# Patient Record
Sex: MALE | Race: WHITE
[De-identification: names, ages, dates, MRNs, and addresses within clinical notes are randomized per-mention and may not be internally consistent; named-entity substitution may affect disease eponyms.]

---

## 2019-05-09 ENCOUNTER — HOSPITAL ENCOUNTER (EMERGENCY)
Dept: HOSPITAL 1 - ED | Age: 84
Discharge: PSYCHIATRIC OR SUBSTANCE USE FACILITY | End: 2019-05-09
Payer: COMMERCIAL

## 2019-05-09 VITALS
HEIGHT: 67 IN | BODY MASS INDEX: 26.68 KG/M2 | BODY MASS INDEX: 26.68 KG/M2 | WEIGHT: 170 LBS | WEIGHT: 170 LBS | HEIGHT: 67 IN

## 2019-05-09 VITALS — DIASTOLIC BLOOD PRESSURE: 85 MMHG | SYSTOLIC BLOOD PRESSURE: 155 MMHG

## 2019-05-09 DIAGNOSIS — S09.8XXA: ICD-10-CM
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
## 2019-05-09 DIAGNOSIS — I10: ICD-10-CM

## 2019-05-09 DIAGNOSIS — F20.9: ICD-10-CM

## 2019-05-09 DIAGNOSIS — E11.9: ICD-10-CM
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
## 2019-05-09 DIAGNOSIS — S20.211A: Primary | ICD-10-CM
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
## 2019-05-11 ENCOUNTER — HOSPITAL ENCOUNTER (INPATIENT)
Dept: HOSPITAL 1 - ED | Age: 84
LOS: 5 days | Discharge: ANOTHER HOSPITAL | DRG: 205 | End: 2019-05-16
Attending: FAMILY MEDICINE | Admitting: FAMILY MEDICINE
Payer: COMMERCIAL

## 2019-05-11 VITALS
HEIGHT: 68 IN | WEIGHT: 175 LBS | BODY MASS INDEX: 26.52 KG/M2 | HEIGHT: 68 IN | BODY MASS INDEX: 26.52 KG/M2 | WEIGHT: 175 LBS

## 2019-05-11 VITALS — DIASTOLIC BLOOD PRESSURE: 65 MMHG | SYSTOLIC BLOOD PRESSURE: 133 MMHG

## 2019-05-11 VITALS — DIASTOLIC BLOOD PRESSURE: 66 MMHG | SYSTOLIC BLOOD PRESSURE: 135 MMHG

## 2019-05-11 VITALS — SYSTOLIC BLOOD PRESSURE: 134 MMHG | DIASTOLIC BLOOD PRESSURE: 83 MMHG

## 2019-05-11 VITALS — SYSTOLIC BLOOD PRESSURE: 120 MMHG | DIASTOLIC BLOOD PRESSURE: 59 MMHG

## 2019-05-11 DIAGNOSIS — Z79.82: ICD-10-CM

## 2019-05-11 DIAGNOSIS — E87.6: ICD-10-CM

## 2019-05-11 DIAGNOSIS — F03.90: ICD-10-CM

## 2019-05-11 DIAGNOSIS — D68.69: ICD-10-CM

## 2019-05-11 DIAGNOSIS — G93.41: ICD-10-CM

## 2019-05-11 DIAGNOSIS — Z79.4: ICD-10-CM

## 2019-05-11 DIAGNOSIS — F20.9: ICD-10-CM

## 2019-05-11 DIAGNOSIS — E78.5: ICD-10-CM

## 2019-05-11 DIAGNOSIS — F20.0: ICD-10-CM

## 2019-05-11 DIAGNOSIS — E43: ICD-10-CM

## 2019-05-11 DIAGNOSIS — E83.42: ICD-10-CM

## 2019-05-11 DIAGNOSIS — Z79.84: ICD-10-CM

## 2019-05-11 DIAGNOSIS — M94.0: Primary | ICD-10-CM

## 2019-05-11 DIAGNOSIS — I10: ICD-10-CM

## 2019-05-11 DIAGNOSIS — N17.0: ICD-10-CM

## 2019-05-11 DIAGNOSIS — N40.0: ICD-10-CM

## 2019-05-11 DIAGNOSIS — E11.9: ICD-10-CM

## 2019-05-11 LAB
ALBUMIN SERPL-MCNC: 2.7 G/DL (ref 3.4–5)
ALP SERPL-CCNC: 68 U/L (ref 46–116)
ALT SERPL-CCNC: 37 U/L (ref 16–63)
AST SERPL-CCNC: 18 U/L (ref 15–37)
BASOPHILS NFR BLD: 0.5 % (ref 0–2)
BILIRUB SERPL-MCNC: 0.5 MG/DL (ref 0.2–1)
BUN SERPL-MCNC: 19 MG/DL (ref 7–18)
CALCIUM SERPL-MCNC: 9.1 MG/DL (ref 8.5–10.1)
CHLORIDE SERPL-SCNC: 100 MMOL/L (ref 98–107)
CHOLEST SERPL-MCNC: 144 MG/DL (ref ?–200)
CHOLEST SERPL-MCNC: 152 MG/DL (ref ?–200)
CHOLEST/HDLC SERPL: 3.5 MG/DL
CO2 SERPL-SCNC: 25.7 MMOL/L (ref 21–32)
CREAT SERPL-MCNC: 1.7 MG/DL (ref 0.7–1.3)
ERYTHROCYTE [DISTWIDTH] IN BLOOD BY AUTOMATED COUNT: 15.6 % (ref 11.5–14.5)
GFR SERPLBLD BASED ON 1.73 SQ M-ARVRAT: (no result) ML/MIN
GLUCOSE SERPL-MCNC: 109 MG/DL (ref 74–106)
HDLC SERPL-MCNC: 41 MG/DL (ref 40–60)
HDLC SERPL-MCNC: 43 MG/DL (ref 40–60)
MAGNESIUM SERPL-MCNC: 1.7 MG/DL (ref 1.8–2.4)
PHOSPHATE SERPL-MCNC: 4.1 MG/DL (ref 2.5–4.9)
PLATELET # BLD: 284 X10^3MCL (ref 130–400)
POTASSIUM SERPL-SCNC: 3.3 MMOL/L (ref 3.5–5.1)
PROT SERPL-MCNC: 6.3 G/DL (ref 6.4–8.2)
SODIUM SERPL-SCNC: 137 MMOL/L (ref 136–145)
TRIGL SERPL-MCNC: 138 MG/DL (ref ?–150)

## 2019-05-11 PROCEDURE — A9537 TC99M MEBROFENIN: HCPCS

## 2019-05-11 NOTE — NUR
RECEIVED PATIENT FROM THE ED VIA [employer]. PATIENT IS STABLE, NO SIGNS OF
APPARETN SOB, PATIENT STATES HE HAS SOME DISCOMFORT TO THE RIGHT FLANK. IV TO
THE LFA INFUSING WELL AT 50ML PER HR. PATIENT IS ON ROOM AIR. INCONTINENT OF
URINE AND BOWEL. PATIENT CONMES IN FROM [facility]. HE IS
CONFUSED AND ALERT AND ORIENTED TO PERSON AND TIME. SAFETY PRECAUTIONS IN
PLACE. SITTER AT BEDSIDE.

## 2019-05-11 NOTE — NUR
ADMINISTERED NOON MEDICATIONS. PATIENT TOLORATED WELL. THE PATIENT IS STABLE,
NO SIGNS OF APPARENT SOB, PATIENT IS CONFUSED, ALERT AND ORIENTED TO PERSON.
SITTER AT BEDSIDE. IV INFUSUNG WELL. NO REDNESS NOTED. PATIENT STATES SOME
DISCOMFORT RIGHT FLANK. PATIENT DENIES OTHER NEEDS AT THIS TIME.

## 2019-05-11 NOTE — NUR
PATIENT IS RESTING IN BED HE IS ASLEEP. NO SIGNS OF PAIN OR APPARENT SOB.
SAFETY PRECAUTION IN PLACE, SITTER AT BEDSIDE.

## 2019-05-11 NOTE — NUR
RECEIVED PT FROM DAY SHIFT RN. PT IS RESTING. PT IS ORIENTED TO SELF AND ABLE
TO FOLLOW SIMPLE COMMMANDS. NO SIGNS OF CHEST PAIN OR SHORTNESS OF BREATH ON
ROOM AIR. NO USE OF ACCESSORY MUSCLES OR LABORED BREATHING ON ASSESSMENT.
THERE IS A LFA 22G IV THAT IS CLEAN DRY AND INTACT AT THIS TIME. PT DOES NOT
SHOW ANY COMBATIVE BEHAVIOR AT THIS TIME. SITTER IS AT THE BEDSIDE. SAFETY
MEASURES ARE IN PLACE. BED IS IN THE LOWEST POSITION. WILL CONTINUE TO
MONITOR.

## 2019-05-11 NOTE — NUR
PT IN POSITION OF COMFORT RESPS E/U
MEDICAL ASSISTANT FROM [facility] AT BEDSIDE
CALL LIGHT WITHIN REACH

## 2019-05-11 NOTE — NUR
ECHO TECH AT BEDSIDE. PERFORMED GLUCOSE CHECK 110. NO COVERAGE NEEDED PER
SLIDING SCALE. PATIENT IS STABLE NO SIGNS OF APPARENT SOB. SITTER AT BEDSIDE.

## 2019-05-11 NOTE — NUR
PT BIB PARAMEDICS FROM [facility] FOR C/O MORE ALTERED THAN USUAL AND FOR RUQ
PAIN WITH N/1 EPISODE OF VOMITING. PER PARAMEDICS VOMIT WAS JUST BILE LOOKING.
PT WAS HERE YESTERDAY FOR A FALL WITH A HEAD INJURY WITH A CT PERFORMED AND NO
ABNORMAL FINDINGS. PT DOES NOT HAVE ANY WOUNDS TO THE HEAD THAT ARE VISIBLE. PT
HAS HX OF HTN, DM, SCHIZOPHRENIA, AND DEMENTIA. PT IS SELECTIVELY ANSWERING
QUESTIONS, BUT RESPONDS APPROPRIATELY. PT IS UNABLE TO TELL ME THE YEAR, BUT
WHEN ASKED WHAT HIS NAME IS HE STATES "CANT YOU READ". PT WILL NOT OPEN HIS
EYES BECAUSE HE SAYS "IM TRYING TO SLEEP". PT IS WEAK AND IS NOT HELPING MOVE
HIS EXTREMETIES OR BODY IN ANY WAY POSSIBLE. PT HAS 2MM PUPILS WITH THE R EYE
MORE RED IN THE SCLERA THAN THE LEFT. PT WAS INCONTINENT UPON TRANSFERRING HIM
TO THE [facility]. PT DENIES ANY PAIN CURRENTLY. PT IS ON A 5250 AT [facility]
FOR BEING AGGRESSIVE AT HIS SENIOR LIVING FACILITY. PT SKIN IS WARM, DRY AND
INTACT. MILD BRUISING WITH SCAB NOTED TO THE TIP OF THE NOSE THAT LOOKS ALMOST
HEALED. PT CONNECTED TO CARDIAC, AND PLETH OX. PT HAS EQUAL AND UNLABORED CHEST
RISE. BREATH SOUNDS ARE CLEAR BILATERALLY, BUT PT HAS NON PRODUCTIVE WET COUGH.
NO ACUTE DISTRESS AT THIS TIME. MD ARRIETA AT BEDSIDE FOR MSE.

## 2019-05-11 NOTE — NUR
PATIENT IS STABLE, HE IS SLEEPING NO APPARENT SIGNS OF SOB, OR PAIN, SITTER AT
BEDSIDE, IV IS INFUSING WELL, NO REDNESS NOTED. SAFETY PRECAUTIONS IN PLACE.
WILL ENDORSE CARE TO NIGHT SHIFT NURSE.

## 2019-05-11 NOTE — NUR
RECEIVED PT FROM [address] [place] [address] NURSE. PT IS AA&O X1 AND CONFUSED AT TIMES. PT
IS NOT SYMPTOMATIC FOR CHEST PAIN OR SHORTNESS OF BREATH. TELE IN PLACE.
SAFETY MEASURES ARE IN PLACE. CALL LIGHT IS WITHIN REACH. WILL CONTINUE TO
MONITOR.

## 2019-05-11 NOTE — NUR
PT IN POSITION OF COMFORT
RESPS E/U
CALL LIGHT WITHIN REACH
MEDICAL ASSISTANT FROM [facility] AT BEDSIDE

## 2019-05-11 NOTE — NUR
REPORT GIVEN TO SE SUTTON MA FROM [facility] STATES THAT PT CAN BE COMBATIVE TOWARDS STAFF.

## 2019-05-11 NOTE — NUR
PATIENT CLEARED BY CARDIOLOGIST FOR TRANSFER TO MED SURG. TELE REMOVED AND
RETURNED TO MONITOR TECH. PATIENT IS STABLE NO SIGNS OF APPARENT SOB. PATIENT
DENIES PAIN AT THIS TIME.

## 2019-05-12 VITALS — SYSTOLIC BLOOD PRESSURE: 126 MMHG | DIASTOLIC BLOOD PRESSURE: 64 MMHG

## 2019-05-12 VITALS — SYSTOLIC BLOOD PRESSURE: 116 MMHG | DIASTOLIC BLOOD PRESSURE: 52 MMHG

## 2019-05-12 VITALS — DIASTOLIC BLOOD PRESSURE: 57 MMHG | SYSTOLIC BLOOD PRESSURE: 120 MMHG

## 2019-05-12 VITALS — DIASTOLIC BLOOD PRESSURE: 64 MMHG | SYSTOLIC BLOOD PRESSURE: 128 MMHG

## 2019-05-12 LAB
ALBUMIN SERPL-MCNC: 2.3 G/DL (ref 3.4–5)
ALP SERPL-CCNC: 56 U/L (ref 46–116)
ALT SERPL-CCNC: 33 U/L (ref 16–63)
AST SERPL-CCNC: 16 U/L (ref 15–37)
BASOPHILS NFR BLD: 0.3 % (ref 0–2)
BILIRUB SERPL-MCNC: 0.49 MG/DL (ref 0.2–1)
BUN SERPL-MCNC: 19 MG/DL (ref 7–18)
CALCIUM SERPL-MCNC: 8.6 MG/DL (ref 8.5–10.1)
CHLORIDE SERPL-SCNC: 107 MMOL/L (ref 98–107)
CO2 SERPL-SCNC: 21.1 MMOL/L (ref 21–32)
CREAT SERPL-MCNC: 2 MG/DL (ref 0.7–1.3)
ERYTHROCYTE [DISTWIDTH] IN BLOOD BY AUTOMATED COUNT: 15.8 % (ref 11.5–14.5)
GFR SERPLBLD BASED ON 1.73 SQ M-ARVRAT: (no result) ML/MIN
GLUCOSE SERPL-MCNC: 112 MG/DL (ref 74–106)
MAGNESIUM SERPL-MCNC: 2 MG/DL (ref 1.8–2.4)
PHOSPHATE SERPL-MCNC: 3.5 MG/DL (ref 2.5–4.9)
PLATELET # BLD: 236 X10^3MCL (ref 130–400)
POTASSIUM SERPL-SCNC: 4.9 MMOL/L (ref 3.5–5.1)
PROT SERPL-MCNC: 5.7 G/DL (ref 6.4–8.2)
SODIUM SERPL-SCNC: 141 MMOL/L (ref 136–145)

## 2019-05-12 NOTE — NUR
PATIENT IS RESTING COMFORATBLY IN BED, ASLEEP. NO APPARENT SIGNS OF SOB, OR
PAIN. SITTER AT BEDSIDE, SAFETY PRECAUTIONS IN PLACE.

## 2019-05-12 NOTE — NUR
NOTIFIED DR LEWIS THAT NUCLEAR MED CANNOT DO HIDA SCAN UNTIL TOMORROW
MORNING. SHE IS AWARE THAT NP KALINA WILL CHANGE NPO STATUS TO CLEAR LIQUID
UNTIL MIDNIGHT WHEN THE PATIENT MUST BE NPO AGAIN FOR HIDA SCAN.

## 2019-05-12 NOTE — NUR
ADMINISTERED NOON MEDS. PATIENT TOLORATED WELL. PATIENT IS AGITATTED BUT
REORIENTED WELL. SITTER AY BEDSIDE. SAFETY PRECAUTIONS IN PLACE.

## 2019-05-12 NOTE — NUR
PT SLEPT THROUGHOUT THE NIGHT. NO COMBATIVE BEHAVIOR NOTED. NO SIGNIFICANT
CHANGES. PT IS RESTING IN BED WITH SITTER AT THE BEDSIDE. WILL ENDORSE TO DAY
SHIFT RN.

## 2019-05-12 NOTE — NUR
RECEIVED BEDSIDE REPORT FROM NIGHT SHIFT NURSE. PATIENT IS STABLE, RESTING
COMFORTABLY IN BED. NO APPARENT SIGNS OF SOB, OR PAIN AT THIS TIME. IV
INFUSING WELL. NS AT 70ML /HR. NO REDNESS NOTED. PATIENT IS CONFUSED ALERT
AND ORIENTED TO PERSON. SAFETY PRECAUTIONS IN PLACE. SITTER AT BEDSIDE.

## 2019-05-12 NOTE — NUR
PATIENT IS STABLE, CONFUSED AND AGITATED AT TIMES. ABLE TO REORIENT AND DOES
WELL. IV IS CDI, NO REDNESS NOTED. SITTER AT BEDSIDE. FAMILY AT BEDSIDE.
PATIENT IS EATING DINNER. WILL ENDORSE CARE TO NIGHT SHIFT NURSE.

## 2019-05-12 NOTE — NUR
PATIENT IS RTESTING ION BED, HE IS CONFUSED AND NEEDS FREQUENT REORIENTING.
DOES WELL AFTER REORIENTING. IV CDI, NO REDNESS NOTED. NO APPARENT SIGNS OF
SOB, OR PAIN, PATIENT DENIES OTHER NEEDS AT THIS TIME. SITTER AT BEDSIDE,
SAFETY PRECAUTIONS IN PLACE.

## 2019-05-12 NOTE — NUR
RECEIVED REPORT FROM DAY SHIFT RN. PT SITTING UP IN BED. A/O TO PERSON.
REORIENTED TO PLACE AND TIME. NO SOB ON ROOM AIR. BREATHING EVEN AND
UNLABORED. NO C/O CHEST PAIN. NO DISTRESS NOTED. IV TO LFA, NS INFUSING.
SAFETY MEASURES IN PLACE. BED IN LOWEST POSITION. SIDE RAILS UP X2. SITTER AT
BEDSIDE.

## 2019-05-12 NOTE — NUR
SPOKE WITH NUCLEAR MEDICAINE TECH. HIDA SCAN CANNOT BE DONE UNTIL TOMORROW
5/13/19. PER DR DEBBIE EPSTEIN TO CHANGE DIET. CALLED KALINA ORELLANA SHE WILL PLACE
ORDER TO CHANGE DIET. PER NUCLEAR MEDICINE TECH PREP IS NPO AFTER MIDNIGHT AND
NO OPIATES AFTER MIDNIGHT.

## 2019-05-12 NOTE — NUR
ADMINISTERED MEDICATIONS AND GLUCOSE CHECK. PATIENT TOLORATED WELL BUT IS
CONFUSED AND NEEDS FREQUENT REORIENTING. IV CDI, NO REDNESS NOTED. SAFETY
PRECAUTIONS IN PLACE. SITTER AT BEDSIDE.

## 2019-05-12 NOTE — NUR
ADMINISTERED MORNING MEDS. PATIENT TOLORATED WELL. NO APPARENT SIGNS OF SOB.
PATIENT DENIES PAIN AT THIS TIME. PATIENT DENIES OTHER NEEDS AT THIS TIME.
SAFETY PRECAUTIONS IN PLACE SITTER AT BEDISDE.

## 2019-05-12 NOTE — NUR
ADMINISTERED MEDICATION. PATIENT IS CONFUSED BUT EASILY REORIENTED. PATIENT
TOLORATED MEDICATION WELL. NO APPARENT SIGNS OF SOB OR RESPIRATORY DISTRESS.
PATIENT DENIED OTHER NEEDS AT THIS TIME[facility] SAFETY PRECATUIONS
IN PLACE.

## 2019-05-13 VITALS — SYSTOLIC BLOOD PRESSURE: 142 MMHG | DIASTOLIC BLOOD PRESSURE: 69 MMHG

## 2019-05-13 VITALS — SYSTOLIC BLOOD PRESSURE: 136 MMHG | DIASTOLIC BLOOD PRESSURE: 36 MMHG

## 2019-05-13 VITALS — SYSTOLIC BLOOD PRESSURE: 132 MMHG | DIASTOLIC BLOOD PRESSURE: 64 MMHG

## 2019-05-13 LAB
BASOPHILS NFR BLD: 1.4 % (ref 0–2)
BUN SERPL-MCNC: 16 MG/DL (ref 7–18)
CALCIUM SERPL-MCNC: 8.4 MG/DL (ref 8.5–10.1)
CHLORIDE SERPL-SCNC: 105 MMOL/L (ref 98–107)
CO2 SERPL-SCNC: 20.2 MMOL/L (ref 21–32)
CREAT SERPL-MCNC: 1.8 MG/DL (ref 0.7–1.3)
ERYTHROCYTE [DISTWIDTH] IN BLOOD BY AUTOMATED COUNT: 15.9 % (ref 11.5–14.5)
GFR SERPLBLD BASED ON 1.73 SQ M-ARVRAT: (no result) ML/MIN
GLUCOSE SERPL-MCNC: 122 MG/DL (ref 74–106)
MAGNESIUM SERPL-MCNC: 1.6 MG/DL (ref 1.8–2.4)
PLATELET # BLD: 220 X10^3MCL (ref 130–400)
POTASSIUM SERPL-SCNC: 3.4 MMOL/L (ref 3.5–5.1)
SODIUM SERPL-SCNC: 138 MMOL/L (ref 136–145)

## 2019-05-13 NOTE — NUR
RECEIVED ORDER FROM KALINA DOVE NP. PT TO HAVE REGULAR DIET STARTING WITH
DINNER ON 5/13/19. ORDER NOTED AND CARRIED OUT. PT AND PT'S FAMILY MADE AWARE.

## 2019-05-13 NOTE — NUR
PT SLEPT IN INTERVALS. NO SOB ON ROOM AIR. NO C/O PAIN. PT REMAINED CONFUSED.
FREQUENT REORIENTATION NEEDED. PT EASILY DISTRACTED. SIMPLE COMMANDS GIVEN.
SAFETY MEASURES MAINTAINED. ALL NEEDS ATTENDED TO. SITTER AT BEDSIDE.

## 2019-05-13 NOTE — NUR
ATIVAN 0.5MG PO GIVEN FOR ANXIETY AND AGITATION. PT DENIES PAIN OR DISCOMFORT.
CALL LIGHT WITHIN REACH. BED IN LOW POSITION. CNA AT BEDSIDE ON ONE TO ONE
SUPERVISION.

## 2019-05-13 NOTE — NUR
PT BACK ON UNIT, HIDA SCAN COMPLETED. IVF STARTED. SITE WNL. DENIES PAIN AT
THIS TIME. CNA IN ROOM ON ONE TO ONE SUPERVISION. CALL LIGHT WITHIN REACH.

## 2019-05-13 NOTE — NUR
PT IS AAOX1 TO SELF. RESP EVEN AND UNLABORED. NO DISTRESS NOTED. DENIES PAIN.
IV CATH TO LFA PATENT WITH FLUIDS RUNNING. SITE WNL. PT ON ONE TO ONE
SUPERVISION WITH CNA AT BEDSIDE. CALL LIGHT WITHIN REACH. BED IN LOWEST
POSITION. WILL ENDORSE ALL CARE TO NOC RN.

## 2019-05-13 NOTE — NUR
MORNING HELD MEDS DUE AT THIS TIME. PT POCKETING MEDS IN MOUTH. ALL MEDS
CRUSHED AND GIVEN IN APPLE SAUCE. PT DENIES PAIN AT THIS TIME. RESP EVEN AND
UNLABORED. NO DISTRESS NOTED. CNA AT BEDSIDE ON ONE TO ONE SUPERVISION. CALL
LIGHT WITHIN REACH.

## 2019-05-13 NOTE — NUR
PT IS AAOX1 TO SELF. CONFUSED. FOLLOWS COMMANDS. VERBALLY RESPONSIVE. NORMAL
S1S2 NOTED. RESP EVEN AND UNLABORED. LUNG SOUNDS CTA. ON R/A. ABDOMEN SOFT,
NONDISTENDED, NONTENDER. BOWEL SOUNDS ACTIVE. PT HAS L GREAT TOE SCAB RHODA. L
CARDOZO SCAB RHODA, BOTH WITH NO S/S OF INFECTION. PT HAS BLE TRACE EDEMA.
PERIPHERAL PULSES PALPABLE. IV CATH TO LFA PATENT. SITE WNL. DENIES PAIN AT
THIS TIME. CNA AT BEDSIDE ON ONE TO ONE SUPERVISION. CALL LIGHT WITHIN REACH.

## 2019-05-13 NOTE — NUR
PT IS WATCHING TV. RESP EVEN AND UNLABORED. NO S/S OF DISTRESS NOTED. DENIES
PAIN. CALL LIGHT WITHIN REACH. CNA IN ROOM ON ONE TO ONE SUPERVISION.

## 2019-05-13 NOTE — NUR
PT RESTING WITH EYES CLOSED. NO SOB ON ROOM AIR. NO DISTRESS NOTED. SAFETY
MEASURES IN PLACE. SITTER AT BEDSIDE.

## 2019-05-13 NOTE — NUR
RECEIVED PT ASLEEP BUT EASILY AROUSABLE,ALERT TO NAME AND BIRTHDATE
ONLY.DENIES ABDOMINAL PAIN.WILL START REGULAR DIET TONIGHT.NURSE AT BEDSIDE TO
ASSIST WITH ADLS./69 MMHG,HR 63.WILL CONTINUE TO MONITOR.

## 2019-05-14 VITALS — DIASTOLIC BLOOD PRESSURE: 62 MMHG | SYSTOLIC BLOOD PRESSURE: 125 MMHG

## 2019-05-14 VITALS — DIASTOLIC BLOOD PRESSURE: 73 MMHG | SYSTOLIC BLOOD PRESSURE: 135 MMHG

## 2019-05-14 VITALS — SYSTOLIC BLOOD PRESSURE: 148 MMHG | DIASTOLIC BLOOD PRESSURE: 76 MMHG

## 2019-05-14 VITALS — SYSTOLIC BLOOD PRESSURE: 139 MMHG | DIASTOLIC BLOOD PRESSURE: 60 MMHG

## 2019-05-14 LAB
BASOPHILS NFR BLD: 0.9 % (ref 0–2)
BUN SERPL-MCNC: 11 MG/DL (ref 7–18)
CALCIUM SERPL-MCNC: 8.2 MG/DL (ref 8.5–10.1)
CHLORIDE SERPL-SCNC: 104 MMOL/L (ref 98–107)
CO2 SERPL-SCNC: 22 MMOL/L (ref 21–32)
CREAT SERPL-MCNC: 1.4 MG/DL (ref 0.7–1.3)
ERYTHROCYTE [DISTWIDTH] IN BLOOD BY AUTOMATED COUNT: 15.3 % (ref 11.5–14.5)
GFR SERPLBLD BASED ON 1.73 SQ M-ARVRAT: (no result) ML/MIN
GLUCOSE SERPL-MCNC: 126 MG/DL (ref 74–106)
MAGNESIUM SERPL-MCNC: 1.8 MG/DL (ref 1.8–2.4)
PLATELET # BLD: 219 X10^3MCL (ref 130–400)
POTASSIUM SERPL-SCNC: 3.6 MMOL/L (ref 3.5–5.1)
SODIUM SERPL-SCNC: 137 MMOL/L (ref 136–145)

## 2019-05-14 NOTE — NUR
PATIENT REPOSITIONED TO LEFT SIDE WITH WEDGE AND EACH LEG SUPPORTED ON A
PILLOW WITH HEELS FLOATING. PATIENT REPORTS BEING COMFORTABLE. WILL REPOSITION
Q2H.

## 2019-05-14 NOTE — NUR
PT SLEPT WELL.DENIES ABDOMINAL PAIN ALL NIGHT.WELL TOLERATED REGULAR
DIET.NURSE AT BEDSIDE TO ASSIST WITH ADLS.NO ASE NOTED FROM ZOSYN IV ATB.ALL
NEEDS MET.WILL CONTINUE TO MONITOR.

## 2019-05-14 NOTE — NUR
REPORT GIVEN TO HERMAN LINDSAY. PATIENT POSITIONED WITH WEDGE UNDER LEFT HIP,
PILLOWS UNDER LEGS, HEELS FLOATING. IV TO LFA INFUSING NS 70 ML/HR WITHOUT
COMPLICATIONS SITTER AT BEDSIDE. REGULAR RESPS, NO DISTRESS NOTED. CARE
ENDORSED.

## 2019-05-14 NOTE — NUR
SCREEN FOR LOW YOEL SCALE AT RISK
PRESSURE ULCER INJURY PREVENTION INTERVENTIONS IN PLACE.
-TURN AND REPOSITION PATIENT Q 2H OFFLOAD LEFT AND RIGHT HIPS
-ASSESS AND MONITOR SKIN CONDITION DURING POSITION CHANGE
-OFFLOAD BILATERAL HEELS BY PLACING PILLOWS UNDER CALVES AT ALL TIMES, UNLESS
OTHERWISE CONTRAINDICATED
-PRESSURE REDISTRIBUTION SURFACE THERAPY
-KEEP SKIN CLEAN AND DRY AT ALL TIMES.

## 2019-05-14 NOTE — NUR
SHIFT ASSESSMENT PERFORMED AND DOCUMENTED. IV TO LFA, NS 70 ML/HR WITHOUT
COMPLICATIONS. PATIENT ORIENTED TO PERSON, SOMEWHAT TO SITUATION. ANSWERS SOME
QUESTIONS INAPPROPRIATELY, BUT GENERALLY COOPERATIVE. REPORTS PAIN TO BACK,
WILL MEDICATE PER MAR. SITTER AT BEDSIDE. PATIENT CAME FROM [facility], 
SCHIZOPHRENIA, DEMENTIA. WILL MONITOR.

## 2019-05-14 NOTE — NUR
RECEIVED PATIENT FROM AM RN NANI. SITTER BY BEDSIDE FOR PATIENT SAFETY.
PATIENT IS ALERT AND ORIENTED X3 WITH EPISODES OF CONFUSION AND
FORGETFULLNESS. SOME OUTBURST NOTED. ALL SAFETY MEASURES REMAIN IN PLACE.
BLANCHABLE REDNESS TO SCROTUM AND COCCYX WITH Z-GUARD. SCABS TO KNEE AND NOSE
RHODA. CALL LGITH WITHIN REACH.

## 2019-05-14 NOTE — NUR
PHYSICAL THERAPY, NIRMALA, WORKING WITH PATIENT. PATIENT SITTING UP IN CHAIR.
SALINE LOCKED AT THIS TIME PER NIRMALA' REQUEST SO PATIENT CAN AMBULATE MORE
EASILY. NO DISTRESS NOTED.

## 2019-05-14 NOTE — NUR
REPORT RCD FROM HERMAN ATKINS. PATIENT SLEEPING, WAKES EASILY, NO DISTRESS, REG
RESPS. NS 70 ML/HR TO LFA WITHOUT COMPLICATIONS. SITTER AT BEDSIDE. WILL
MONITOR. BED LOW.

## 2019-05-15 VITALS — SYSTOLIC BLOOD PRESSURE: 131 MMHG | DIASTOLIC BLOOD PRESSURE: 66 MMHG

## 2019-05-15 VITALS — DIASTOLIC BLOOD PRESSURE: 69 MMHG | SYSTOLIC BLOOD PRESSURE: 134 MMHG

## 2019-05-15 VITALS — DIASTOLIC BLOOD PRESSURE: 56 MMHG | SYSTOLIC BLOOD PRESSURE: 126 MMHG

## 2019-05-15 VITALS — DIASTOLIC BLOOD PRESSURE: 54 MMHG | SYSTOLIC BLOOD PRESSURE: 102 MMHG

## 2019-05-15 LAB
BASOPHILS NFR BLD: 0.5 % (ref 0–2)
BUN SERPL-MCNC: 11 MG/DL (ref 7–18)
CALCIUM SERPL-MCNC: 8.5 MG/DL (ref 8.5–10.1)
CHLORIDE SERPL-SCNC: 106 MMOL/L (ref 98–107)
CO2 SERPL-SCNC: 22.3 MMOL/L (ref 21–32)
CREAT SERPL-MCNC: 1.6 MG/DL (ref 0.7–1.3)
ERYTHROCYTE [DISTWIDTH] IN BLOOD BY AUTOMATED COUNT: 15.7 % (ref 11.5–14.5)
GFR SERPLBLD BASED ON 1.73 SQ M-ARVRAT: (no result) ML/MIN
GLUCOSE SERPL-MCNC: 111 MG/DL (ref 74–106)
PLATELET # BLD: 202 X10^3MCL (ref 130–400)
POTASSIUM SERPL-SCNC: 3.3 MMOL/L (ref 3.5–5.1)
SODIUM SERPL-SCNC: 135 MMOL/L (ref 136–145)

## 2019-05-15 NOTE — NUR
PATIENT QUIETLY SLEEPING. SITTER BY BEDSIDE. PATIENT HAD MOMENTS OF OUTBURST
LAST NIGHT ALONG WITH CONFUSION. REORIENTED TO PLACE, TIME AND SITUATION. ALL
OTHER SAFETY MEASURES IN PLACE.

## 2019-05-15 NOTE — NUR
INFORMED NP KALINA ABOUT K=3.3 AND ALSO INFORMED ABOUT PT HAS ERYTHEMA TO
PERIRECTAL AREA AND RECCOMENDED NYSTATIN POWDER OR OTHER MEDICINE. APPLYING
ZGAURDS NOW. INFORMED NP ABOUT PT HAD 2 LOOSE STOOL TODAY. NO NEW ORDER
RECEIVED THIS TIME.

## 2019-05-15 NOTE — NUR
RECEIVED PATIENT IN BED AWAKE, ALERT AND ORIENTED X2 WITH NO SIGN OF ACUTE
DISTRESS. BREATHING EASY AND NONLABOR WITH CLEAR BS SATTING AT 98% RA. SITTER
AT BEDSIDE. ABDOMEN SOFT AND NONTENDER WITH ACTIVE BS. IV TO LAC INTACT AND
INFUSING WELL. WILL CONTINUE TO MONITOR.

## 2019-05-15 NOTE — NUR
RECEIVED PT FROM NIGHT SHIFT, ASSESSED AND DOCUMENTED. DENIES PAIN THIS TIME.
SAFTEY PRECUATIONS ARE IN PLACE.

## 2019-05-15 NOTE — NUR
PATIENT RESTING IN BED AT THIS TIME. REQUESTING FOR HOT SOUP. INFORMED PATIENT
THAT SOUP IS NOT AVAILABLE AT THIS TIME AND REORIENTED PATIENT TO TIME. ASKED
IF PATIENT WOULD LIKE A SLEEPING PILL TO ASSIST WITH SLEEP. PATIENT AGREED.
SITTER REMAINS [facility] BEDSIDE. [facility]

## 2019-05-16 VITALS — SYSTOLIC BLOOD PRESSURE: 120 MMHG | DIASTOLIC BLOOD PRESSURE: 49 MMHG

## 2019-05-16 VITALS — SYSTOLIC BLOOD PRESSURE: 137 MMHG | DIASTOLIC BLOOD PRESSURE: 67 MMHG

## 2019-05-16 VITALS — DIASTOLIC BLOOD PRESSURE: 67 MMHG | SYSTOLIC BLOOD PRESSURE: 137 MMHG

## 2019-05-16 VITALS — DIASTOLIC BLOOD PRESSURE: 68 MMHG | SYSTOLIC BLOOD PRESSURE: 133 MMHG

## 2019-05-16 NOTE — NUR
[facility] TRANSPORT[facility] CAME. REPORT GIVEN AND TRANFER PACKET GIVEN. IV WAS REMOVED
AND DRESSING APPLIED. PT IS STABLE. DENIES PAIN. PT TRANSFERED TO [facility]
VIA [facility].

## 2019-05-16 NOTE — NUR
APPEARS SLEEPING WITH EYES CLOSED AFTER ATIVAN WAS GIVEN. WILL CONTINUE TO
MONITOR. ITTER AT BEDSIDE.

## 2019-05-16 NOTE — NUR
PATIENT HAVING OUTBURST SHOUTING AND YELLING, ATIVAN 0.5MG PO GIVEN AS
PRESCRIBED. WILL CONTINUE TO MONITOR.

## 2019-05-16 NOTE — NUR
SPOKE WITH STAFF FROM [facility] AND GAVE REPORT ABOUT PT AS ZEYAD SESAY
REQUESTED. STAFF SAID SHE WILL REVIEW PT CHART FROM THERE AND WILL CALL BACK.

## 2019-05-16 NOTE — NUR
Initial Nutrition Assessment- 207/B PARTHA CASTELLANOS MR
 
 Dx: Chest pain, abd pain
 PMHx: HTN, DM, dementia and schizophrenia
 PSHx: None
 Labs: NA 135L, BG 111H, CREAT 1.6H, HGB 12.4H, A1C 8.4H
 Meds: D50%, humulin, lactinex, lantus, Lipitor, NS, zofran
Diet: Regular
PO Intake: (5/15) 82% average
 Ht: 172.72 cm (68") Wt: 79 kg (174#) BMI: 26.6 kg/m2
IBW: 154# (70 kg) %IBW:  112 UBW: unable to access
 Age: 83/M
 Food Allergies: NKFA
 Skin: redness to buttocks
Buddy: 15
Edema: none
GI: last BM:5/15
 
Per H&P, Pt is 84yo M with PMH of HTN, DM, dementia and schizophrenia was
admitted on active 5250 hold with cc of 1 day of 10/10 sharp RUQ pain.
Positive associated 10/10 right chest pain and nausea with 1 episode of
nonbloody vomiting.
 
RDN visit(5/16):  Pt was awake but disoriented. Pt said that he does not have
any N/V/D/C at this time and has "Good" appetite. ARIADNE Barth was sitting by
bedside and said that pt ate almost 100% breakfast. Called NP Akira to
discuss recommendations and change diet order to [facility].
 
 Problem with:  N: no    V: no    D:  no  C: no
 Problems with: Chewing/Swallowing:  no
 Current appetite: good
Recent wt change: unable to access
Vitamin/Supplement use: no
Special diet at home: Regular
Physical activity: unable to access
Education: 'Type 2 DM Medical nutrition therapy' NCM handout was provided and
CNA said that she will give it to patient's wife, since pt has psychological
conditions.
 
 Estimated Nutritional Needs Based on actual body weight 79 kg
 Energy: 1684-2466 kcal/d (25-30 kcal/kg-maintenance)
 Protein: 63-79 g/d (0.8-1.0 g/kg)-maintenance and preservation of lean body
mass
 Fluid: 1796-8799 ml/d  (1 ml/kcal-fluid balance) or per doctor
 
Nutrition Diagnosis
1. Altered nutrition related lab values related to endocrine dysfunction as
evidenced by A1C 8.4.
 
Intervention
1. Recommend changing current diet order to [facility] as pt has A1C of 8.4
 
Monitor/Evaluate
 Goal: PO intake at least 75% of estimated needs
 Monitor: PO intake, Labs, GI function
 F/U in 7 days as low risk 5/23

## 2019-05-16 NOTE — NUR
CALLED AND INFORMED PT'S WIFE ORLIN ABOUT PT IS GETTING DISCHARGED AND GOING
BACK TO [facility], SHE AGREED AND RECEIVED TELEPHONE CONSENT FOR TRANSFER.
PT IS STABLE. % OF DINNER. SITTER AT BEDSIDE FOR SAFTEY AND WAITING FOR
TRANSPORTATION.

## 2019-05-16 NOTE — NUR
STAFF FROM [facility] CALLED CHARGE NURSE AND INFROMED HER ABOUT THEY WILL
TAKE PT BACK. SHE INFORMED NP KALINA AND SHE IS PUTTING DC ORDER.

## 2019-05-16 NOTE — NUR
RECEIVED PT IN BED. ASSESSED AND DOCUMENTED. DENIES PAIN THIS TIME. PT IS
SLEEPING THIS TIME. SAFTEY PRECAUTIONS ARE IN PLACE. WILL MONITOR.

## 2019-05-21 ENCOUNTER — HOSPITAL ENCOUNTER (EMERGENCY)
Dept: HOSPITAL 1 - ED | Age: 84
Discharge: HOME | End: 2019-05-21
Payer: COMMERCIAL

## 2019-05-21 VITALS — DIASTOLIC BLOOD PRESSURE: 91 MMHG | SYSTOLIC BLOOD PRESSURE: 115 MMHG

## 2019-05-21 VITALS — WEIGHT: 170 LBS | BODY MASS INDEX: 27.32 KG/M2 | HEIGHT: 66 IN

## 2019-05-21 DIAGNOSIS — F20.9: ICD-10-CM

## 2019-05-21 DIAGNOSIS — R53.1: ICD-10-CM

## 2019-05-21 DIAGNOSIS — E11.9: ICD-10-CM

## 2019-05-21 DIAGNOSIS — I10: ICD-10-CM

## 2019-05-21 DIAGNOSIS — R55: Primary | ICD-10-CM

## 2019-05-21 DIAGNOSIS — F03.90: ICD-10-CM

## 2019-05-21 LAB
ALBUMIN SERPL-MCNC: 2.5 G/DL (ref 3.4–5)
ALP SERPL-CCNC: 64 U/L (ref 46–116)
ALT SERPL-CCNC: 18 U/L (ref 16–63)
AST SERPL-CCNC: 14 U/L (ref 15–37)
BASOPHILS NFR BLD: 0.4 % (ref 0–2)
BILIRUB SERPL-MCNC: 0.62 MG/DL (ref 0.2–1)
BUN SERPL-MCNC: 13 MG/DL (ref 7–18)
CALCIUM SERPL-MCNC: 9.6 MG/DL (ref 8.5–10.1)
CHLORIDE SERPL-SCNC: 98 MMOL/L (ref 98–107)
CHOLEST SERPL-MCNC: 124 MG/DL (ref ?–200)
CO2 SERPL-SCNC: 23.7 MMOL/L (ref 21–32)
CREAT SERPL-MCNC: 1.4 MG/DL (ref 0.7–1.3)
ERYTHROCYTE [DISTWIDTH] IN BLOOD BY AUTOMATED COUNT: 15.5 % (ref 11.5–14.5)
GFR SERPLBLD BASED ON 1.73 SQ M-ARVRAT: (no result) ML/MIN
GLUCOSE SERPL-MCNC: 155 MG/DL (ref 74–106)
HDLC SERPL-MCNC: 36 MG/DL (ref 40–60)
PHOSPHATE SERPL-MCNC: 3 MG/DL (ref 2.5–4.9)
PLATELET # BLD: 257 X10^3MCL (ref 130–400)
POTASSIUM SERPL-SCNC: 3.7 MMOL/L (ref 3.5–5.1)
PROT SERPL-MCNC: 6.8 G/DL (ref 6.4–8.2)
SODIUM SERPL-SCNC: 131 MMOL/L (ref 136–145)
URATE SERPL-MCNC: 5.9 MG/DL (ref 3.5–7.2)

## 2019-06-18 ENCOUNTER — HOSPITAL ENCOUNTER (INPATIENT)
Dept: HOSPITAL 36 - ER | Age: 84
LOS: 7 days | Discharge: NURSING, REHAB OR LONG-TERM CARE | DRG: 640 | End: 2019-06-25
Attending: INTERNAL MEDICINE | Admitting: INTERNAL MEDICINE
Payer: MEDICARE

## 2019-06-18 DIAGNOSIS — F29: ICD-10-CM

## 2019-06-18 DIAGNOSIS — I25.10: ICD-10-CM

## 2019-06-18 DIAGNOSIS — R62.7: ICD-10-CM

## 2019-06-18 DIAGNOSIS — M19.90: ICD-10-CM

## 2019-06-18 DIAGNOSIS — F33.9: ICD-10-CM

## 2019-06-18 DIAGNOSIS — Z86.73: ICD-10-CM

## 2019-06-18 DIAGNOSIS — E86.0: Primary | ICD-10-CM

## 2019-06-18 DIAGNOSIS — E78.5: ICD-10-CM

## 2019-06-18 DIAGNOSIS — E11.9: ICD-10-CM

## 2019-06-18 DIAGNOSIS — Z79.4: ICD-10-CM

## 2019-06-18 DIAGNOSIS — E87.6: ICD-10-CM

## 2019-06-18 DIAGNOSIS — R53.2: ICD-10-CM

## 2019-06-18 DIAGNOSIS — I10: ICD-10-CM

## 2019-06-18 DIAGNOSIS — E44.0: ICD-10-CM

## 2019-06-18 DIAGNOSIS — I49.9: ICD-10-CM

## 2019-06-18 DIAGNOSIS — F03.91: ICD-10-CM

## 2019-06-18 DIAGNOSIS — E87.1: ICD-10-CM

## 2019-06-18 DIAGNOSIS — N40.0: ICD-10-CM

## 2019-06-18 LAB
ALBUMIN SERPL-MCNC: 3.4 GM/DL (ref 4.2–5.5)
ALBUMIN/GLOB SERPL: 0.9 {RATIO} (ref 1–1.8)
ALP SERPL-CCNC: 75 U/L (ref 34–104)
ALT SERPL-CCNC: 25 U/L (ref 7–52)
ANION GAP SERPL CALC-SCNC: 23.9 MMOL/L (ref 7–16)
AST SERPL-CCNC: 12 U/L (ref 13–39)
BILIRUB SERPL-MCNC: 0.7 MG/DL (ref 0.3–1)
BUN SERPL-MCNC: 14 MG/DL (ref 7–25)
CALCIUM SERPL-MCNC: 10 MG/DL (ref 8.6–10.3)
CHLORIDE SERPL-SCNC: 97 MEQ/L (ref 98–107)
CK SERPL-CCNC: 27 U/L (ref 30–223)
CO2 SERPL-SCNC: 15.9 MEQ/L (ref 21–31)
CREAT SERPL-MCNC: 1.2 MG/DL (ref 0.7–1.3)
GLOBULIN SER-MCNC: 3.8 GM/DL
GLUCOSE SERPL-MCNC: 137 MG/DL (ref 70–105)
INR PPP: 1.02 (ref 0.5–1.4)
LIPASE SERPL-CCNC: 21 U/L (ref 11–82)
POTASSIUM SERPL-SCNC: 2.8 MEQ/L (ref 3.5–5.1)
SODIUM SERPL-SCNC: 134 MEQ/L (ref 136–145)

## 2019-06-18 PROCEDURE — Z7610: HCPCS

## 2019-06-18 NOTE — ED PHYSICIAN CHART
ED Chief Complaint/HPI





- Patient Information


Date Seen:: 06/18/19


Time Seen:: 21:46


Chief Complaint:: Dehydration


History of Present Illness:: 


83 yo male was brought from SNF to ER for evaluation of dehydration, failure to 

thrive and refusal of medication for 2 days. His labs on 6/10/19 showed WBC 8.9

, BUN 13, Cr 1.3. Pt was put on 5150 for gravely disabled on 6/15/19. On 

arrival at ER, pt refused evaluation and treatment. He was screaming, yelling 

and attacking nursing staff. 





Allergies:: 


 Allergies











Allergy/AdvReac Type Severity Reaction Status Date / Time


 


No Known Allergies Allergy   Verified 06/18/19 21:38














ED Review of Systems





- Review of Systems


General/Constitutional: Fever, Chills, Weakness, Other (Agitation)


Skin: No rash


Head: No headache


Eyes: No pain


ENT: No earache


Neck: No neck pain


Cardio Vascular: No chest pain


Pulmonary: No SOB


GI: No nausea, No vomiting


Musculoskeletal: No bone or joint pain


Psychiatric: Prior psych history


Neurological: No focal symptoms





ED Past Medical History





- Past Medical History


Past Medical History: HTN, DM, CAD, Dyslipidemia (Hyperlipidemia), Arthritis, 

Other (BPH)


Social History: Non Smoker, No Alcohol, No Drug Use


Psychiatricy History: Other (Psychosis, paranoid)





Family Medical History





- Family Member


  ** Mother


History Unknown: Yes





ED Physical Exam





- Physical Examination


General/Constitutional: Awake, Alert


Head: Atraumatic


Eyes: PERRL


Skin: No skin lesions


ENMT: Nasal exam nl


Neck: No nuchal rigidity


Respiratory: No Wheeze/Rhonchi/Rales


Cardio Vascular: RRR, No murmur, gallop, rubs, NL S1 S2


GI: No tenderness/rebounding/guarding


Extremities: normal strength in all extremities


Neuro/Psych: No focal deficits





ED Labs/Radiology/EKG Results





- Lab Results


Results: 





 Laboratory Last Values











WBC  10.0 Th/cmm (4.8-10.8)   06/18/19  22:45    


 


RBC  5.54 Mil/cmm (3.80-5.80)   06/18/19  22:45    


 


Hgb  15.9 gm/dL (12-16)   06/18/19  22:45    


 


Hct  48.0 % (41.0-60)   06/18/19  22:45    


 


MCV  86.6 fl (80-99)   06/18/19  22:45    


 


MCH  28.7 pg (27.0-31.0)   06/18/19  22:45    


 


MCHC Differential  33.1 pg (28.0-36.0)   06/18/19  22:45    


 


RDW  16.8 % (11.5-20.0)   06/18/19  22:45    


 


Plt Count  207 Th/cmm (150-400)   06/18/19  22:45    


 


MPV  11.0 fl  06/18/19  22:45    


 


Add Manual Diff     06/18/19  22:45    


 


Neutrophils %  61.2 % (40.0-80.0)   06/18/19  22:45    


 


Lymphocytes %  27.6 % (20.0-50.0)   06/18/19  22:45    


 


Monocytes %  9.7 % (2.0-10.0)   06/18/19  22:45    


 


Eosinophils %  1.0 % (0.0-5.0)   06/18/19  22:45    


 


Basophils %  0.5 % (0.0-2.0)   06/18/19  22:45    


 


PT  10.6 SECONDS (9.5-11.5)   06/18/19  22:45    


 


INR  1.02  (0.5-1.4)   06/18/19  22:45    


 


PTT (Actin FS)  31.9 SECONDS (26.0-38.0)   06/18/19  22:45    


 


Sodium  135 mEq/L (136-145)  L  06/19/19  06:00    


 


Potassium  3.0 mEq/L (3.5-5.1)  L  06/19/19  06:00    


 


Chloride  102 mEq/L ()   06/19/19  06:00    


 


Carbon Dioxide  16.9 mEq/L (21.0-31.0)  L  06/19/19  06:00    


 


Anion Gap  19.1  (7.0-16.0)  H  06/19/19  06:00    


 


BUN  12 mg/dL (7-25)   06/19/19  06:00    


 


Creatinine  1.0 mg/dL (0.7-1.3)   06/19/19  06:00    


 


Est GFR ( Amer)  TNP   06/19/19  06:00    


 


Est GFR (Non-Af Amer)  TNP   06/19/19  06:00    


 


BUN/Creatinine Ratio  12.0   06/19/19  06:00    


 


Glucose  128 mg/dL ()  H  06/19/19  06:00    


 


POC Glucose  108 MG/DL (70 - 105)  H  06/19/19  06:54    


 


Calcium  9.1 mg/dL (8.6-10.3)   06/19/19  06:00    


 


Magnesium  1.8 mg/dL (1.9-2.7)  L  06/19/19  06:00    


 


Total Bilirubin  0.7 mg/dL (0.3-1.0)   06/18/19  22:45    


 


AST  12 U/L (13-39)  L  06/18/19  22:45    


 


ALT  25 U/L (7-52)   06/18/19  22:45    


 


Alkaline Phosphatase  75 U/L ()   06/18/19  22:45    


 


Ammonia  35 umol/L (16-53)   06/19/19  06:00    


 


Creatine Kinase  27 U/L ()  L  06/18/19  22:45    


 


Troponin I  0.02 ng/mL (0.01-0.05)   06/18/19  22:45    


 


B-Natriuretic Peptide  43.8 pg/mL (5.0-100.0)   06/18/19  22:45    


 


Total Protein  7.2 gm/dL (6.0-8.3)   06/18/19  22:45    


 


Albumin  3.4 gm/dL (4.2-5.5)  L  06/18/19  22:45    


 


Globulin  3.8 gm/dL  06/18/19  22:45    


 


Albumin/Globulin Ratio  0.9  (1.0-1.8)  L  06/18/19  22:45    


 


Lipase  21 U/L (11-82)   06/18/19  22:45    


 


TSH  4.40 uIU/ml (0.34-5.60)   06/19/19  06:00    














- Radiology Results


Results: 


CXR: no acute abnormalities





- EKG Interpretations


EKG Time:: 23:01


Rate & Rhythm: 66 bpm, sinus rhythm


Axis: Normal axis


Intervals: Short OR interval 


Comments:: 


Borderline EKG





ED Assessment





- Assessment


General Assessment: 


Hypokalemia


Mild hyponatremia


Dehydration


Hypertension


DM II


Psychosis





Assessment/Comments:: 


Haldol 5 mg IM


Ativan 1 mg IM


Benadryl 50 mg IM 


CBC, CMP, Troponin, BNP, UA


CXR, EKG


NS 1L IV bolus


D5 1/2 NS plus KCl 20 mEq IV








ED Septic Shock





- .


Is Septic Shock (SBP<90, OR Lactate>4 mmol\L) present?: No





ED Reassessment (Disposition)





- Reassessment


Reassessment Condition:: Improved





- Patient Disposition


Discharge/Transfer:: Acute Care w/in this hosp


Admitting Medical Physician:: João Andrew

## 2019-06-19 VITALS — DIASTOLIC BLOOD PRESSURE: 83 MMHG | SYSTOLIC BLOOD PRESSURE: 164 MMHG

## 2019-06-19 LAB
AMMONIA BLD-SCNC: 35 UMOL/L (ref 16–53)
ANION GAP SERPL CALC-SCNC: 19.1 MMOL/L (ref 7–16)
BASOPHILS # BLD AUTO: 0.1 TH/CUMM (ref 0–0.2)
BASOPHILS # BLD AUTO: 0.1 TH/CUMM (ref 0–0.2)
BASOPHILS NFR BLD AUTO: 0.5 % (ref 0–2)
BASOPHILS NFR BLD AUTO: 1.2 % (ref 0–2)
BUN SERPL-MCNC: 12 MG/DL (ref 7–25)
CALCIUM SERPL-MCNC: 9.1 MG/DL (ref 8.6–10.3)
CHLORIDE SERPL-SCNC: 102 MEQ/L (ref 98–107)
CO2 SERPL-SCNC: 16.9 MEQ/L (ref 21–31)
CREAT SERPL-MCNC: 1 MG/DL (ref 0.7–1.3)
EOSINOPHIL # BLD AUTO: 0.1 TH/CMM (ref 0.1–0.4)
EOSINOPHIL # BLD AUTO: 0.1 TH/CMM (ref 0.1–0.4)
EOSINOPHIL NFR BLD AUTO: 1 % (ref 0–5)
EOSINOPHIL NFR BLD AUTO: 1.7 % (ref 0–5)
ERYTHROCYTE [DISTWIDTH] IN BLOOD BY AUTOMATED COUNT: 16.7 % (ref 11.5–20)
ERYTHROCYTE [DISTWIDTH] IN BLOOD BY AUTOMATED COUNT: 16.8 % (ref 11.5–20)
GLUCOSE SERPL-MCNC: 128 MG/DL (ref 70–105)
HCT VFR BLD CALC: 43.6 % (ref 41–60)
HCT VFR BLD CALC: 48 % (ref 41–60)
HGB BLD-MCNC: 14.3 GM/DL (ref 12–16)
HGB BLD-MCNC: 15.9 GM/DL (ref 12–16)
LYMPHOCYTE AB SER FC-ACNC: 2.8 TH/CMM (ref 1.5–3)
LYMPHOCYTE AB SER FC-ACNC: 2.8 TH/CMM (ref 1.5–3)
LYMPHOCYTES NFR BLD AUTO: 27.6 % (ref 20–50)
LYMPHOCYTES NFR BLD AUTO: 33 % (ref 20–50)
MAGNESIUM SERPL-MCNC: 1.8 MG/DL (ref 1.9–2.7)
MCH RBC QN AUTO: 28.4 PG (ref 27–31)
MCH RBC QN AUTO: 28.7 PG (ref 27–31)
MCHC RBC AUTO-ENTMCNC: 32.7 PG (ref 28–36)
MCHC RBC AUTO-ENTMCNC: 33.1 PG (ref 28–36)
MCV RBC AUTO: 86.6 FL (ref 80–99)
MCV RBC AUTO: 86.9 FL (ref 80–99)
MONOCYTES # BLD AUTO: 1 TH/CMM (ref 0.3–1)
MONOCYTES # BLD AUTO: 1 TH/CMM (ref 0.3–1)
MONOCYTES NFR BLD AUTO: 11.4 % (ref 2–10)
MONOCYTES NFR BLD AUTO: 9.7 % (ref 2–10)
NEUTROPHILS # BLD: 4.5 TH/CMM (ref 1.8–8)
NEUTROPHILS # BLD: 6.2 TH/CMM (ref 1.8–8)
NEUTROPHILS NFR BLD AUTO: 52.7 % (ref 40–80)
NEUTROPHILS NFR BLD AUTO: 61.2 % (ref 40–80)
PLATELET # BLD: 167 TH/CMM (ref 150–400)
PLATELET # BLD: 207 TH/CMM (ref 150–400)
POTASSIUM SERPL-SCNC: 3 MEQ/L (ref 3.5–5.1)
RBC # BLD AUTO: 5.02 MIL/CMM (ref 3.8–5.8)
RBC # BLD AUTO: 5.54 MIL/CMM (ref 3.8–5.8)
SODIUM SERPL-SCNC: 135 MEQ/L (ref 136–145)
WBC # BLD AUTO: 10 TH/CMM (ref 4.8–10.8)
WBC # BLD AUTO: 8.5 TH/CMM (ref 4.8–10.8)

## 2019-06-19 RX ADMIN — INSULIN LISPRO SCH: 100 INJECTION, SOLUTION INTRAVENOUS; SUBCUTANEOUS at 16:56

## 2019-06-19 RX ADMIN — DEXTROSE AND SODIUM CHLORIDE SCH MLS/HR: 5; .9 INJECTION, SOLUTION INTRAVENOUS at 08:33

## 2019-06-19 RX ADMIN — INSULIN LISPRO SCH: 100 INJECTION, SOLUTION INTRAVENOUS; SUBCUTANEOUS at 20:40

## 2019-06-19 RX ADMIN — DEXTROSE AND SODIUM CHLORIDE SCH MLS/HR: 5; .9 INJECTION, SOLUTION INTRAVENOUS at 16:54

## 2019-06-19 RX ADMIN — INSULIN LISPRO SCH: 100 INJECTION, SOLUTION INTRAVENOUS; SUBCUTANEOUS at 11:57

## 2019-06-19 RX ADMIN — ASPIRIN 81 MG SCH MG: 81 TABLET ORAL at 10:05

## 2019-06-19 RX ADMIN — INSULIN LISPRO SCH: 100 INJECTION, SOLUTION INTRAVENOUS; SUBCUTANEOUS at 06:57

## 2019-06-19 NOTE — DIAGNOSTIC IMAGING REPORT
CT scan of the brain without intravenous contrast



HISTORY: Stroke, CVA



Total DLP equals 646



CTDI equals 36.7



Axial sections were obtained from the base of the skull to the vertex.



There is prominence/enlargement of the ventricular system size.

Associated enlargement of cerebral sulci and subarachnoid cisterns.

Findings are consistent with changes of generalized cerebral atrophy. No

acute parenchymal abnormalities. No acute cerebral hemorrhage.

Hypodensity is seen within the supratentorial white matter regions

without mass effect. The findings may be associated with chronic small

vessel ischemic disease.  A somewhat more discrete 1.0 cm focus is noted

in the right parietal white matter region.  No mass effect.  Findings

consistent with an old infarct.  No extra-axial masses or abnormal fluid

collections.



IMPRESSION:

1. No acute abnormalities

2. Cerebral atrophy

3. Supratentorial white matter changes that may reflect chronic small

vessel ischemic disease

4.  1.0 cm focus within the right parietal white matter region which

appears chronic and consistent with an old infarct.

## 2019-06-19 NOTE — HISTORY & PHYSICAL
ADMIT DATE:  06/19/2019



CHIEF COMPLAINT:  Severe dehydration.



HISTORY OF PRESENT ILLNESS:  This is an 84-year-old  male with history

of diabetes, hypertension, hypercholesterolemia, BPH, CAD, cardiac arrhythmia,

admitted from [facility] psychiatric hospital secondary to not eating or drinking. 

The patient to be evaluated.   The patient was sent to the ER for evaluation,

noted to be confused and requiring admission.



PAST MEDICAL HISTORY:  As mentioned in the history present illness.



PAST SURGICAL HISTORY:  Unable to obtain from the patient.



ALLERGIES:  No known drug allergies.



MEDICATIONS:  The patient was on metformin, Lantus, Norvasc, aspirin, donepezil,

olanzapine, ____.



FAMILY HISTORY:  Noncontributory.



SOCIAL HISTORY:  The patient is a nursing home patient requiring 24-hour total

care.



REVIEW OF SYSTEMS:  This is limited secondary to pain, comatose state.  We will

try to obtain more detailed review of systems at a later date by talking to

family members.  There is a spouse and they do not currently have the name, I

will try to get that.  We will get information from her.  I have also got the

some information from nursing staff as well as from Dr. Luis who called to

____ the patient.



PHYSICAL EXAMINATION:

VITAL SIGNS:  Blood pressure 118/41, respiration 18, pulse 91, temperature 98.0.

GENERAL:  Elderly male, appears chronically ill.

NECK:  Supple.  No mass.

LUNGS:  Equal breath sounds, few rhonchi.

HEART:  Regular rate and rhythm with systolic ejection murmur.

ABDOMEN:  Soft, globular.

EXTREMITIES:  Positive excoriations, atrophy.  Excoriations on the sacral area.

NEUROLOGIC:  Limited.



LABORATORY DATA:  WBC 8.5, hemoglobin 14, platelets 167.  Sodium 130, potassium

2.8, BUN 14.  Creatinine 1.2, previously was 1.3.  Blood sugar 134 and 128. 

Magnesium 1.8, albumin was 3.4.



ASSESSMENT:  Failure to thrive, dehydration, diabetes, hypertension, BPH, CAD,

cardiac arrhythmia, hypercholesterolemia, moderate protein calorie malnutrition,

poor p.o. intake.  Psychiatry is following the patient.



PLAN:  We will continue the patient on gentle IV hydration.  Monitor for any

signs and symptoms of fluid overload.  We will titrate the patient on diabetic

medications.  Hold Lantus.  Continue with insulin sliding scale.  We will

perform a head CT ____ the patient.  We will try to get information from the

spouse as well, supposedly she is coming to have a conversation.  Discussed with

nursing staff as well.





DD: 06/19/2019 12:48

DT: 06/19/2019 13:09

JOB# [id number]  [id number]

## 2019-06-20 LAB
ANION GAP SERPL CALC-SCNC: 14 MMOL/L (ref 7–16)
BASOPHILS # BLD AUTO: 0.1 TH/CUMM (ref 0–0.2)
BASOPHILS NFR BLD AUTO: 1 % (ref 0–2)
BUN SERPL-MCNC: 7 MG/DL (ref 7–25)
CALCIUM SERPL-MCNC: 9 MG/DL (ref 8.6–10.3)
CHLORIDE SERPL-SCNC: 103 MEQ/L (ref 98–107)
CO2 SERPL-SCNC: 20.9 MEQ/L (ref 21–31)
CREAT SERPL-MCNC: 1 MG/DL (ref 0.7–1.3)
EOSINOPHIL # BLD AUTO: 0.3 TH/CMM (ref 0.1–0.4)
EOSINOPHIL NFR BLD AUTO: 3.8 % (ref 0–5)
ERYTHROCYTE [DISTWIDTH] IN BLOOD BY AUTOMATED COUNT: 15.7 % (ref 11.5–20)
GLUCOSE SERPL-MCNC: 130 MG/DL (ref 70–105)
HCT VFR BLD CALC: 41.7 % (ref 41–60)
HGB BLD-MCNC: 13.9 GM/DL (ref 12–16)
LYMPHOCYTE AB SER FC-ACNC: 2 TH/CMM (ref 1.5–3)
LYMPHOCYTES NFR BLD AUTO: 22.5 % (ref 20–50)
MAGNESIUM SERPL-MCNC: 2.1 MG/DL (ref 1.9–2.7)
MCH RBC QN AUTO: 28.5 PG (ref 27–31)
MCHC RBC AUTO-ENTMCNC: 33.4 PG (ref 28–36)
MCV RBC AUTO: 85.6 FL (ref 80–99)
MONOCYTES # BLD AUTO: 0.3 TH/CMM (ref 0.3–1)
MONOCYTES NFR BLD AUTO: 3.5 % (ref 2–10)
NEUTROPHILS # BLD: 6.2 TH/CMM (ref 1.8–8)
NEUTROPHILS NFR BLD AUTO: 69.2 % (ref 40–80)
PLATELET # BLD: 176 TH/CMM (ref 150–400)
POTASSIUM SERPL-SCNC: 2.9 MEQ/L (ref 3.5–5.1)
RBC # BLD AUTO: 4.87 MIL/CMM (ref 3.8–5.8)
SODIUM SERPL-SCNC: 135 MEQ/L (ref 136–145)
WBC # BLD AUTO: 8.9 TH/CMM (ref 4.8–10.8)

## 2019-06-20 RX ADMIN — INSULIN LISPRO SCH: 100 INJECTION, SOLUTION INTRAVENOUS; SUBCUTANEOUS at 12:18

## 2019-06-20 RX ADMIN — INSULIN LISPRO SCH: 100 INJECTION, SOLUTION INTRAVENOUS; SUBCUTANEOUS at 17:17

## 2019-06-20 RX ADMIN — INSULIN LISPRO SCH: 100 INJECTION, SOLUTION INTRAVENOUS; SUBCUTANEOUS at 06:34

## 2019-06-20 RX ADMIN — ASPIRIN 81 MG SCH: 81 TABLET ORAL at 10:18

## 2019-06-20 RX ADMIN — INSULIN LISPRO SCH: 100 INJECTION, SOLUTION INTRAVENOUS; SUBCUTANEOUS at 20:38

## 2019-06-20 RX ADMIN — DEXTROSE AND SODIUM CHLORIDE SCH: 5; .9 INJECTION, SOLUTION INTRAVENOUS at 03:18

## 2019-06-20 RX ADMIN — DEXTROSE AND SODIUM CHLORIDE SCH MLS/HR: 5; .9 INJECTION, SOLUTION INTRAVENOUS at 17:54

## 2019-06-20 NOTE — PSYCHIATRIC EVALUATION
DATE OF SERVICE:  06/20/2019



PSYCHIATRIC EVALUATION



REQUESTING PHYSICIAN:  Dr. Andrew.



REASON FOR CONSULTATION:  Psychosis.



HISTORY OF PRESENT ILLNESS:  This patient is an 84-year-old  male

transferred from a Psychiatric Unit.  The patient is reported to be a resident

of [facility].  The patient has been refusing to eat or drink and has been

getting easily agitated.  The patient has been admitted over here for this acute

psychosis and failure to thrive and Psychiatric consultation is called to

address the issue of the psychosis and I tried to interview the patient, the

patient is greeting me even though it is the 7 p.m.  The patient is stating good

morning and the patient is reported to have been refusing to eat or drink.  The

patient has been getting severely dehydrated.  The patient's wife is reported to

be there yesterday and she has been frustrated with the patient and is stating

that it may be a good idea for the patient to go on hospice and continue

treatment over there.  Review of the chart indicated that the patient has been

having multiple medical problems, diabetes, hypertension, hypercholesterolemia,

BPH, CAD and cardiac arrhythmia and the patient is being followed up by Dr. Andrew at this time.



PAST PSYCHIATRIC HISTORY:  The patient is reported to have been at [facility]

and has been transferred over here for medical stabilization.



SOCIAL HISTORY:  The patient is reported to be  and wife has been very

supportive.  The patient has been in [facility] in [place].



MENTAL STATUS EXAMINATION:  The patient is an 84-year-old thin built,

superficially cooperative.  Eye contact is poor.  Mood is noted to be irritable.

 Affect is constricted.  Insight and judgment at this time are noted to be very

much impaired.  The patient is screaming and yelling and is reluctant to comply

with any of the medications.  The patient has no insight into his illness. 

Continues to be paranoid.  The patient is demented and has not been able to

figure it out what time it is.  The patient is stating that he does not like the

hospital, does not like the food and he wants to be left alone.  The patient is

not making a reasonable decision as to his care is concerned.



DIAGNOSTIC IMPRESSION:

AXIS I:  Psychotic disorder, not otherwise specified.

IB:  Dementia and behavioral change, secondary trait.



PLAN:  To use the Haldol in place of the Zyprexa since the patient is not

willing to take anything by mouth with the hope that this can be given IM or IV

and the patient is going to be closely monitored.  Once stabilized, the patient

is going to be discharged back to the skilled nursing facility.



Thank you, Dr. Andrew for allowing me to participate in the care of the

patient.





DD: 06/20/2019 19:08

DT: 06/20/2019 20:43

JOB# [id number]  [id number]

## 2019-06-20 NOTE — INTERNAL MEDICINE PROG NOTE
Internal Medicine Subjective





- Subjective


Patient seen and examined:: with staff, chart reviewed


Patient is:: awake, non-verbal, non-interactive, in bed, agitated, confused


Patient Complaints of:: congestion, unable to sleep


Per staff patient has:: no adverse event, poor appetite, poor oral intake, 

agitated, combative





Internal Medicine Objective





- Results


Result Diagrams: 


 06/20/19 05:35





 06/20/19 05:35


Recent Labs: 


 Laboratory Last Values











WBC  8.9 Th/cmm (4.8-10.8)   06/20/19  05:35    


 


RBC  4.87 Mil/cmm (3.80-5.80)   06/20/19  05:35    


 


Hgb  13.9 gm/dL (12-16)   06/20/19  05:35    


 


Hct  41.7 % (41.0-60)   06/20/19  05:35    


 


MCV  85.6 fl (80-99)   06/20/19  05:35    


 


MCH  28.5 pg (27.0-31.0)   06/20/19  05:35    


 


MCHC Differential  33.4 pg (28.0-36.0)   06/20/19  05:35    


 


RDW  15.7 % (11.5-20.0)   06/20/19  05:35    


 


Plt Count  176 Th/cmm (150-400)   06/20/19  05:35    


 


MPV  10.3 fl  06/20/19  05:35    


 


Add Manual Diff     06/18/19  22:45    


 


Neutrophils %  69.2 % (40.0-80.0)   06/20/19  05:35    


 


Lymphocytes %  22.5 % (20.0-50.0)   06/20/19  05:35    


 


Monocytes %  3.5 % (2.0-10.0)   06/20/19  05:35    


 


Eosinophils %  3.8 % (0.0-5.0)   06/20/19  05:35    


 


Basophils %  1.0 % (0.0-2.0)   06/20/19  05:35    


 


PT  10.6 SECONDS (9.5-11.5)   06/18/19  22:45    


 


INR  1.02  (0.5-1.4)   06/18/19  22:45    


 


PTT (Actin FS)  31.9 SECONDS (26.0-38.0)   06/18/19  22:45    


 


Sodium  135 mEq/L (136-145)  L  06/20/19  05:35    


 


Potassium  2.9 mEq/L (3.5-5.1)  L*  06/20/19  05:35    


 


Chloride  103 mEq/L ()   06/20/19  05:35    


 


Carbon Dioxide  20.9 mEq/L (21.0-31.0)  L  06/20/19  05:35    


 


Anion Gap  14.0  (7.0-16.0)   06/20/19  05:35    


 


BUN  7 mg/dL (7-25)   06/20/19  05:35    


 


Creatinine  1.0 mg/dL (0.7-1.3)   06/20/19  05:35    


 


Est GFR ( Amer)  TNP   06/20/19  05:35    


 


Est GFR (Non-Af Amer)  TNP   06/20/19  05:35    


 


BUN/Creatinine Ratio  7.0   06/20/19  05:35    


 


Glucose  130 mg/dL ()  H  06/20/19  05:35    


 


POC Glucose  127 MG/DL (70 - 105)  H  06/20/19  05:56    


 


Calcium  9.0 mg/dL (8.6-10.3)   06/20/19  05:35    


 


Magnesium  2.1 mg/dL (1.9-2.7)   06/20/19  05:35    


 


Total Bilirubin  0.7 mg/dL (0.3-1.0)   06/18/19  22:45    


 


AST  12 U/L (13-39)  L  06/18/19  22:45    


 


ALT  25 U/L (7-52)   06/18/19  22:45    


 


Alkaline Phosphatase  75 U/L ()   06/18/19  22:45    


 


Ammonia  35 umol/L (16-53)   06/19/19  06:00    


 


Creatine Kinase  27 U/L ()  L  06/18/19  22:45    


 


Troponin I  0.02 ng/mL (0.01-0.05)   06/18/19  22:45    


 


B-Natriuretic Peptide  71.7 pg/mL (5.0-100.0)   06/20/19  05:35    


 


Total Protein  7.2 gm/dL (6.0-8.3)   06/18/19  22:45    


 


Albumin  3.4 gm/dL (4.2-5.5)  L  06/18/19  22:45    


 


Globulin  3.8 gm/dL  06/18/19  22:45    


 


Albumin/Globulin Ratio  0.9  (1.0-1.8)  L  06/18/19  22:45    


 


Lipase  21 U/L (11-82)   06/18/19  22:45    


 


TSH  4.40 uIU/ml (0.34-5.60)   06/19/19  06:00    














- Physical Exam


Vitals and I&O: 


 Vital Signs











Temp  97.8 F   06/20/19 08:00


 


Pulse  78   06/20/19 11:42


 


Resp  18   06/20/19 11:42


 


BP  131/48   06/20/19 08:00


 


Pulse Ox  98   06/20/19 11:42








 Intake & Output











 06/19/19 06/20/19 06/20/19





 18:59 06:59 18:59


 


Intake Total 668 240 


 


Balance 668 240 


 


Weight (lbs) 72.575 kg 72.575 kg 


 


Intake:   


 


  Intake, IV Amount 668  


 


    D5-0.9%Ns 1,000 ml @ 80 668  





    mls/hr IV .[id number] [facility] Rx   





    #:[id number]   


 


  Oral  240 


 


Other:   


 


  # Voids 2 3 


 


  # Bowel Movements 0  


 


  Weight Source Bedscale Bedscale 











Active Medications: 


Current Medications





Acetaminophen (Tylenol)  650 mg PO Q4H PRN


   PRN Reason: Pain Or Fever above 101


   Stop: 08/17/19 23:43


Al Hydrox/Mg Hydrox/Simethicone (Maalox)  30 ml PO Q6H PRN


   PRN Reason: Dyspepsia


   Stop: 08/17/19 23:43


Albuterol Sulfate (Albuterol 2.5mg/3ml Neb Ud)  2.5 mg HHN Q2HRT PRN


   PRN Reason: Shortness of Breath or Wheeze


   Stop: 08/17/19 23:43


Amlodipine Besylate (Norvasc)  10 mg PO DAILY [facility]


   Stop: 08/18/19 08:59


   Last Admin: 06/20/19 10:17 Dose:  Not Given


Aspirin (Aspirin Chewable)  81 mg PO DAILY [facility]


   Stop: 08/18/19 08:59


   Last Admin: 06/20/19 10:18 Dose:  Not Given


Dextrose (D50w)  50 ml IVP PRN PRN


   PRN Reason: Blood Glucose less than 70


   Stop: 08/17/19 23:45


Dextrose (Glutose 40%)  18.75 gm PO PRN PRN


   PRN Reason: Blood Glucose less than 70


   Stop: 08/17/19 23:45


Donepezil HCl (Aricept)  5 mg PO HS [facility]


   Stop: 08/18/19 20:59


   Last Admin: 06/19/19 20:40 Dose:  Not Given


Glucagon (Glucagen)  1 mg IM PRN PRN


   PRN Reason: Blood Glucose less than 70


   Stop: 08/17/19 23:45


Guaifenesin (Robitussin)  200 mg PO Q4HR PRN


   PRN Reason: Cough or Congestion


   Stop: 08/17/19 23:43


Dextrose/Sodium Chloride (D5-0.9%Ns)  1,000 mls @ 80 mls/hr IV .[id number] [facility]


   Stop: 08/18/19 01:59


   Last Admin: 06/19/19 16:54 Dose:  80 mls/hr


Potassium Chloride 40 meq/Lidocaine HCl 25 mg/ Sodium Chloride  272.5 mls @ 68 

mls/hr IV X1 ONE


   Stop: 06/20/19 13:44


   Last Admin: 06/20/19 11:08 Dose:  68 mls/hr


Insulin Human Lispro (Humalog Insulin Sliding Scale)  0 units SUBQ ACHS [facility]; 

Protocol


   Stop: 08/18/19 07:29


   Last Admin: 06/20/19 06:34 Dose:  Not Given


Ipratropium Bromide (Atrovent Neb 0.5mg/2.5ml)  0.5 mg HHN Q2HRT PRN


   PRN Reason: Shortness of Breath or Wheeze


   Stop: 08/17/19 23:43


Lorazepam (Ativan)  0.5 mg PO Q8HR PRN; Protocol


   PRN Reason: Agitation


   Stop: 08/17/19 23:46


Megestrol Acetate (Megace)  400 mg PO BID [facility]; Protocol


   Stop: 08/18/19 16:59


   Last Admin: 06/20/19 10:18 Dose:  Not Given


Mupirocin (Bactroban Oint)  1 appl NS BID [facility]


   Stop: 06/25/19 09:01


Nitroglycerin (Nitrostat)  0.4 mg SL Q5MIN PRN


   PRN Reason: Chest Pain


   Stop: 08/17/19 23:43


Olanzapine (Zyprexa)  5 mg PO HS [facility]; Protocol


   Stop: 08/18/19 20:59


   Last Admin: 06/19/19 20:41 Dose:  Not Given


Olanzapine (Zyprexa)  2.5 mg PO QAM [facility]; Protocol


   Stop: 08/18/19 08:59


   Last Admin: 06/20/19 10:18 Dose:  Not Given


Ondansetron HCl (Zofran)  4 mg IV Q8H PRN


   PRN Reason: Nausea / Vomiting


   Stop: 08/17/19 23:43


Propranolol HCl (Inderal)  10 mg PO BID [facility]


   Stop: 08/18/19 08:59


   Last Admin: 06/20/19 10:18 Dose:  Not Given


Simvastatin (Zocor)  20 mg PO DAILY [facility]; Protocol


   Stop: 08/18/19 08:59


   Last Admin: 06/20/19 10:18 Dose:  Not Given


Tamsulosin HCl (Flomax)  0.8 mg PO DAILY [facility]


   Stop: 08/18/19 08:59


   Last Admin: 06/20/19 10:18 Dose:  Not Given








General: demented, thin


HEENT: NC/AT, PERRLA, EOMI


Neck: Supple, No JVD


Lungs: congested, rales


Cardiovascular: RRR, Normal S1, Normal S2, with murmur


Abdomen: soft, thin, globular, positive bowel sound


Extremities: excoriation, contracture


Neurological: no change, disorganized





Internal Medicine Assmt/Plan





- Assessment


Assessment: 





ASSESSMENT:  Failure to thrive, dehydration, diabetes, hypertension, BPH, CAD,


cardiac arrhythmia, hypercholesterolemia, moderate protein calorie malnutrition,


poor p.o. intake.  chronic stroke





- Plan


Plan: 








PLAN:  We will continue the patient on gentle IV hydration.  Monitor for any


signs and symptoms of fluid overload.  We will titrate the patient on diabetic


medications.  Hold Lantus.  Continue with insulin sliding scale.  We will


perform a head CT ___to assess cns status of_ the patient.  We will try to get 

information from the


spouse as well, supposedly she is coming to have a conversation.  Discussed with


nursing staff as well.

## 2019-06-21 LAB
FOLATE SERPL-MCNC: 7.5 NG/ML (ref 3–?)
VIT B12 SERPL-MCNC: 709 PG/ML (ref 232–1245)

## 2019-06-21 RX ADMIN — INSULIN LISPRO SCH: 100 INJECTION, SOLUTION INTRAVENOUS; SUBCUTANEOUS at 12:01

## 2019-06-21 RX ADMIN — INSULIN LISPRO SCH: 100 INJECTION, SOLUTION INTRAVENOUS; SUBCUTANEOUS at 17:11

## 2019-06-21 RX ADMIN — INSULIN LISPRO SCH: 100 INJECTION, SOLUTION INTRAVENOUS; SUBCUTANEOUS at 21:25

## 2019-06-21 RX ADMIN — ASPIRIN 81 MG SCH: 81 TABLET ORAL at 10:12

## 2019-06-21 RX ADMIN — INSULIN LISPRO SCH: 100 INJECTION, SOLUTION INTRAVENOUS; SUBCUTANEOUS at 06:33

## 2019-06-21 RX ADMIN — DEXTROSE AND SODIUM CHLORIDE SCH MLS/HR: 5; .9 INJECTION, SOLUTION INTRAVENOUS at 21:17

## 2019-06-21 RX ADMIN — DEXTROSE AND SODIUM CHLORIDE SCH MLS/HR: 5; .9 INJECTION, SOLUTION INTRAVENOUS at 08:06

## 2019-06-21 NOTE — INTERNAL MEDICINE PROG NOTE
Internal Medicine Subjective





- Subjective


Patient seen and examined:: with staff, chart reviewed


Patient is:: awake, non-verbal, non-interactive, in bed, agitated, confused


Patient Complaints of:: congestion, unable to sleep


Per staff patient has:: no adverse event, poor appetite, poor oral intake, 

agitated, combative





Internal Medicine Objective





- Results


Result Diagrams: 


 06/20/19 05:35





 06/20/19 05:35


Recent Labs: 


 Laboratory Last Values











WBC  8.9 Th/cmm (4.8-10.8)   06/20/19  05:35    


 


RBC  4.87 Mil/cmm (3.80-5.80)   06/20/19  05:35    


 


Hgb  13.9 gm/dL (12-16)   06/20/19  05:35    


 


Hct  41.7 % (41.0-60)   06/20/19  05:35    


 


MCV  85.6 fl (80-99)   06/20/19  05:35    


 


MCH  28.5 pg (27.0-31.0)   06/20/19  05:35    


 


MCHC Differential  33.4 pg (28.0-36.0)   06/20/19  05:35    


 


RDW  15.7 % (11.5-20.0)   06/20/19  05:35    


 


Plt Count  176 Th/cmm (150-400)   06/20/19  05:35    


 


MPV  10.3 fl  06/20/19  05:35    


 


Add Manual Diff     06/18/19  22:45    


 


Neutrophils %  69.2 % (40.0-80.0)   06/20/19  05:35    


 


Lymphocytes %  22.5 % (20.0-50.0)   06/20/19  05:35    


 


Monocytes %  3.5 % (2.0-10.0)   06/20/19  05:35    


 


Eosinophils %  3.8 % (0.0-5.0)   06/20/19  05:35    


 


Basophils %  1.0 % (0.0-2.0)   06/20/19  05:35    


 


PT  10.6 SECONDS (9.5-11.5)   06/18/19  22:45    


 


INR  1.02  (0.5-1.4)   06/18/19  22:45    


 


PTT (Actin FS)  31.9 SECONDS (26.0-38.0)   06/18/19  22:45    


 


Sodium  135 mEq/L (136-145)  L  06/20/19  05:35    


 


Potassium  2.9 mEq/L (3.5-5.1)  L*  06/20/19  05:35    


 


Chloride  103 mEq/L ()   06/20/19  05:35    


 


Carbon Dioxide  20.9 mEq/L (21.0-31.0)  L  06/20/19  05:35    


 


Anion Gap  14.0  (7.0-16.0)   06/20/19  05:35    


 


BUN  7 mg/dL (7-25)   06/20/19  05:35    


 


Creatinine  1.0 mg/dL (0.7-1.3)   06/20/19  05:35    


 


Est GFR ( Amer)  TNP   06/20/19  05:35    


 


Est GFR (Non-Af Amer)  TNP   06/20/19  05:35    


 


BUN/Creatinine Ratio  7.0   06/20/19  05:35    


 


Glucose  130 mg/dL ()  H  06/20/19  05:35    


 


POC Glucose  153 MG/DL (70 - 105)  H  06/20/19  20:21    


 


Calcium  9.0 mg/dL (8.6-10.3)   06/20/19  05:35    


 


Magnesium  2.1 mg/dL (1.9-2.7)   06/20/19  05:35    


 


Total Bilirubin  0.7 mg/dL (0.3-1.0)   06/18/19  22:45    


 


AST  12 U/L (13-39)  L  06/18/19  22:45    


 


ALT  25 U/L (7-52)   06/18/19  22:45    


 


Alkaline Phosphatase  75 U/L ()   06/18/19  22:45    


 


Ammonia  35 umol/L (16-53)   06/19/19  06:00    


 


Creatine Kinase  27 U/L ()  L  06/18/19  22:45    


 


Troponin I  0.02 ng/mL (0.01-0.05)   06/18/19  22:45    


 


B-Natriuretic Peptide  71.7 pg/mL (5.0-100.0)   06/20/19  05:35    


 


Total Protein  7.2 gm/dL (6.0-8.3)   06/18/19  22:45    


 


Albumin  3.4 gm/dL (4.2-5.5)  L  06/18/19  22:45    


 


Globulin  3.8 gm/dL  06/18/19  22:45    


 


Albumin/Globulin Ratio  0.9  (1.0-1.8)  L  06/18/19  22:45    


 


Lipase  21 U/L (11-82)   06/18/19  22:45    


 


TSH  4.40 uIU/ml (0.34-5.60)   06/19/19  06:00    














- Physical Exam


Vitals and I&O: 


 Vital Signs











Temp  98.0 F   06/21/19 08:00


 


Pulse  79   06/21/19 08:00


 


Resp  18   06/21/19 08:00


 


BP  146/56   06/21/19 08:00


 


Pulse Ox  97   06/21/19 08:00








 Intake & Output











 06/20/19 06/21/19 06/21/19





 18:59 06:59 18:59


 


Intake Total 600 900 


 


Balance 600 900 


 


Weight (lbs) 74.389 kg 74.389 kg 


 


Intake:   


 


  Intake, IV Amount  800 


 


    D5-0.9%Ns 1,000 ml @ 80  800 





    mls/hr IV .[id number] [facility] Rx   





    #:[id number]   


 


  Oral 600 100 


 


Other:   


 


  # Voids 4  


 


  # Bowel Movements 1 1 


 


  Weight Source Bedscale Bedscale 











Active Medications: 


Current Medications





Acetaminophen (Tylenol)  650 mg PO Q4H PRN


   PRN Reason: Pain Or Fever above 101


   Stop: 08/17/19 23:43


Al Hydrox/Mg Hydrox/Simethicone (Maalox)  30 ml PO Q6H PRN


   PRN Reason: Dyspepsia


   Stop: 08/17/19 23:43


Albuterol Sulfate (Albuterol 2.5mg/3ml Neb Ud)  2.5 mg HHN Q2HRT PRN


   PRN Reason: Shortness of Breath or Wheeze


   Stop: 08/17/19 23:43


Amlodipine Besylate (Norvasc)  10 mg PO DAILY [facility]


   Stop: 08/18/19 08:59


   Last Admin: 06/21/19 10:12 Dose:  Not Given


Aspirin (Aspirin Chewable)  81 mg PO DAILY [facility]


   Stop: 08/18/19 08:59


   Last Admin: 06/21/19 10:12 Dose:  Not Given


Dextrose (D50w)  50 ml IVP PRN PRN


   PRN Reason: Blood Glucose less than 70


   Stop: 08/17/19 23:45


Dextrose (Glutose 40%)  18.75 gm PO PRN PRN


   PRN Reason: Blood Glucose less than 70


   Stop: 08/17/19 23:45


Donepezil HCl (Aricept)  5 mg PO HS [facility]


   Stop: 08/18/19 20:59


   Last Admin: 06/20/19 20:10 Dose:  Not Given


Glucagon (Glucagen)  1 mg IM PRN PRN


   PRN Reason: Blood Glucose less than 70


   Stop: 08/17/19 23:45


Guaifenesin (Robitussin)  200 mg PO Q4HR PRN


   PRN Reason: Cough or Congestion


   Stop: 08/17/19 23:43


Haloperidol (Haldol)  1 mg PO BID [facility]; Protocol


   Stop: 08/20/19 08:59


Dextrose/Sodium Chloride (D5-0.9%Ns)  1,000 mls @ 80 mls/hr IV .[id number] [facility]


   Stop: 08/18/19 01:59


   Last Admin: 06/21/19 08:06 Dose:  80 mls/hr


Insulin Human Lispro (Humalog Insulin Sliding Scale)  0 units SUBQ ACHS [facility]; 

Protocol


   Stop: 08/18/19 07:29


   Last Admin: 06/21/19 12:01 Dose:  Not Given


Ipratropium Bromide (Atrovent Neb 0.5mg/2.5ml)  0.5 mg HHN Q2HRT PRN


   PRN Reason: Shortness of Breath or Wheeze


   Stop: 08/17/19 23:43


Lorazepam (Ativan)  0.5 mg PO Q8HR PRN; Protocol


   PRN Reason: Agitation


   Stop: 08/17/19 23:46


Megestrol Acetate (Megace)  400 mg PO BID [facility]; Protocol


   Stop: 08/18/19 16:59


   Last Admin: 06/21/19 10:13 Dose:  Not Given


Mupirocin (Bactroban Oint)  1 appl NS BID [facility]


   Stop: 06/25/19 09:01


   Last Admin: 06/21/19 08:21 Dose:  1 appl


Nitroglycerin (Nitrostat)  0.4 mg SL Q5MIN PRN


   PRN Reason: Chest Pain


   Stop: 08/17/19 23:43


Ondansetron HCl (Zofran)  4 mg IV Q8H PRN


   PRN Reason: Nausea / Vomiting


   Stop: 08/17/19 23:43


Propranolol HCl (Inderal)  10 mg PO BID [facility]


   Stop: 08/18/19 08:59


   Last Admin: 06/21/19 10:13 Dose:  Not Given


Simvastatin (Zocor)  20 mg PO DAILY [facility]; Protocol


   Stop: 08/18/19 08:59


   Last Admin: 06/21/19 10:13 Dose:  Not Given


Tamsulosin HCl (Flomax)  0.8 mg PO DAILY [facility]


   Stop: 08/18/19 08:59


   Last Admin: 06/21/19 10:13 Dose:  Not Given








General: demented, thin


HEENT: NC/AT, PERRLA, EOMI


Neck: Supple, No JVD


Lungs: congested, rales


Cardiovascular: RRR, Normal S1, Normal S2, with murmur


Abdomen: soft, thin, globular, positive bowel sound


Extremities: excoriation, contracture


Neurological: no change, disorganized





Internal Medicine Assmt/Plan





- Assessment


Assessment: 





ASSESSMENT:  Failure to thrive, dehydration, diabetes, hypertension, BPH, CAD,


cardiac arrhythmia, hypercholesterolemia, moderate protein calorie malnutrition,


poor p.o. intake.  chronic stroke





- Plan


Plan: 








PLAN:  We will continue the patient on gentle IV hydration.  Monitor for any


signs and symptoms of fluid overload.  We will titrate the patient on diabetic


medications.  Hold Lantus.  Continue with insulin sliding scale.  We will


perform a head CT ___to assess cns status of_ the patient.  We will try to get 

information from the


spouse as well, supposedly she is coming to have a conversation.  Discussed with


nursing staff as well.

## 2019-06-21 NOTE — CONSULTATION
DATE OF CONSULTATION:  06/20/2019



HISTORY OF PRESENT ILLNESS:  The patient was seen, chart reviewed.  Wife was the

president.  The patient is an 84-year-old male who was hospitalized at [facility] for increased paranoia, agitation, and refusing to eat.  The

patient transferred to Med/Surg for dehydration and the patient is receiving

treatment at this time.  The patient is still anxious, irritable, refusing

medications off and on.  The patient, however, appears to be calm right now with

his wife.



PAST PSYCHIATRIC HISTORY:  Prior hospitalization, history of dementia, and

psychosis, and depression.



PAST MEDICAL HISTORY:  Refer to Dr. Andrew and Mara.



CURRENT MEDICATION:  List reviewed.



PSYCHOSOCIAL HISTORY:  The patient resides in a nursing home and requires

complete care.  The patient's wife is supportive.



MENTAL STATUS EXAMINATION:  Speech fluent, short sentences and relevant to

topic.  Affect dysphoric, anxious, guarded.  The patient is oriented to person

and be in some kind of facility, not oriented to time.



ASSESSMENT:  Psychosis, not otherwise specified, rule out major depressive

disorder with psychosis.



PLAN:  At this time, I would recommend continuation of Zyprexa 2.5 mg p.o.

q.a.m. and 5 mg p.o. at bedtime.  Monitor p.o. intake.  Once the patient is

medically stable, the patient will need psychiatric hospitalization.



Thank you for the consultation.





DD: 06/20/2019 11:59

DT: 06/21/2019 00:18

[facility]# [id number]  [id number]

## 2019-06-22 RX ADMIN — INSULIN LISPRO SCH: 100 INJECTION, SOLUTION INTRAVENOUS; SUBCUTANEOUS at 20:12

## 2019-06-22 RX ADMIN — INSULIN LISPRO SCH: 100 INJECTION, SOLUTION INTRAVENOUS; SUBCUTANEOUS at 12:01

## 2019-06-22 RX ADMIN — HALOPERIDOL LACTATE SCH: 2 SOLUTION, CONCENTRATE ORAL at 14:45

## 2019-06-22 RX ADMIN — INSULIN LISPRO SCH: 100 INJECTION, SOLUTION INTRAVENOUS; SUBCUTANEOUS at 18:39

## 2019-06-22 RX ADMIN — INSULIN LISPRO SCH: 100 INJECTION, SOLUTION INTRAVENOUS; SUBCUTANEOUS at 07:08

## 2019-06-22 RX ADMIN — ASPIRIN 81 MG SCH: 81 TABLET ORAL at 10:26

## 2019-06-22 RX ADMIN — HALOPERIDOL LACTATE SCH: 2 SOLUTION, CONCENTRATE ORAL at 18:39

## 2019-06-22 NOTE — PROGRESS NOTES
DATE:  06/21/2019



SUBJECTIVE:  Staff was spoken to.  The patient is interviewed.  Mood is noted to

be irritable.  Affect is constricted.  Insight and judgment are noted to be

still impaired.  Impulse control is noted to be limited.  Coping skills are

noted to be limited.  The patient has been having difficult time to cope with

the stress.  No side effects to the medications are noted.  The patient is

reluctant to comply with the treatment.



ASSESSMENT:  The patient is grossly psychotic and impulsive.



PLAN:  To continue the patient with the supportive therapy.  I encouraged the

patient to verbalize the concerns rather than to act out.





DD: 06/21/2019 18:25

DT: 06/22/2019 04:55

JOB# [id number]  [id number]

## 2019-06-22 NOTE — PROGRESS NOTES
DATE:  06/22/2019



PSYCHIATRIC PROGRESS NOTE



PROGRESS ON THE UNIT:  Staff was spoken to.  The patient is interviewed.  Mood

is noted to be irritable.  Affect is constricted.  The patient is stating that

he wants to be left alone.  He tells that he spoke to his wife and he does not

want to live and does not want to take any medications.  The patient is refusing

to eat or drink.  The patient even pulled the IV lines out.  The patient has no

insight into his illness.  Coping skills are noted to be extremely poor.  The

patient has been given the Haldol for severe agitation, but so far the staff are

mentioning that they have not to come across a point where they need to give the

medication IM.



ASSESSMENT:  The patient is still not able to make a reasonable decision as to

his care is concerned.



PLAN:  To continue the patient with supportive therapy.  I encouraged him to

comply with the treatment.





DD: 06/22/2019 14:36

DT: 06/22/2019 14:44

JOB# [id number]  [id number]

## 2019-06-22 NOTE — INTERNAL MEDICINE PROG NOTE
Internal Medicine Subjective





- Subjective


Service Date: 19 (refusing to eat, drink, lab works, and ivf. patients 

wife at beside and changed code status to DNR and requesting for hospice eval)


Patient is:: awake, non-interactive, in bed, agitated, confused


Patient Complaints of:: congestion, unable to sleep


Per staff patient has:: no adverse event, poor appetite, poor oral intake, 

agitated, combative





Internal Medicine Objective





- Results


Result Diagrams: 


 19 05:35





 19 05:35


Recent Labs: 


 Laboratory Last Values











WBC  8.9 Th/cmm (4.8-10.8)   19  05:35    


 


RBC  4.87 Mil/cmm (3.80-5.80)   19  05:35    


 


Hgb  13.9 gm/dL (12-16)   19  05:35    


 


Hct  41.7 % (41.0-60)   19  05:35    


 


MCV  85.6 fl (80-99)   19  05:35    


 


MCH  28.5 pg (27.0-31.0)   19  05:35    


 


MCHC Differential  33.4 pg (28.0-36.0)   19  05:35    


 


RDW  15.7 % (11.5-20.0)   19  05:35    


 


Plt Count  176 Th/cmm (150-400)   19  05:35    


 


MPV  10.3 fl  19  05:35    


 


Add Manual Diff     19  22:45    


 


Neutrophils %  69.2 % (40.0-80.0)   19  05:35    


 


Lymphocytes %  22.5 % (20.0-50.0)   19  05:35    


 


Monocytes %  3.5 % (2.0-10.0)   19  05:35    


 


Eosinophils %  3.8 % (0.0-5.0)   19  05:35    


 


Basophils %  1.0 % (0.0-2.0)   19  05:35    


 


PT  10.6 SECONDS (9.5-11.5)   19  22:45    


 


INR  1.02  (0.5-1.4)   19  22:45    


 


PTT (Actin FS)  31.9 SECONDS (26.0-38.0)   19  22:45    


 


Sodium  135 mEq/L (136-145)  L  19  05:35    


 


Potassium  2.9 mEq/L (3.5-5.1)  L*  19  05:35    


 


Chloride  103 mEq/L ()   19  05:35    


 


Carbon Dioxide  20.9 mEq/L (21.0-31.0)  L  19  05:35    


 


Anion Gap  14.0  (7.0-16.0)   19  05:35    


 


BUN  7 mg/dL (7-25)   19  05:35    


 


Creatinine  1.0 mg/dL (0.7-1.3)   19  05:35    


 


Est GFR ( Amer)  TNP   19  05:35    


 


Est GFR (Non-Af Amer)  TNP   19  05:35    


 


BUN/Creatinine Ratio  7.0   19  05:35    


 


Glucose  130 mg/dL ()  H  19  05:35    


 


POC Glucose  153 MG/DL (70 - 105)  H  19  20:21    


 


Calcium  9.0 mg/dL (8.6-10.3)   19  05:35    


 


Magnesium  2.1 mg/dL (1.9-2.7)   19  05:35    


 


Total Bilirubin  0.7 mg/dL (0.3-1.0)   19  22:45    


 


AST  12 U/L (13-39)  L  19  22:45    


 


ALT  25 U/L (7-52)   19  22:45    


 


Alkaline Phosphatase  75 U/L ()   19  22:45    


 


Ammonia  35 umol/L (16-53)   19  06:00    


 


Creatine Kinase  27 U/L ()  L  19  22:45    


 


Troponin I  0.02 ng/mL (0.01-0.05)   19  22:45    


 


B-Natriuretic Peptide  71.7 pg/mL (5.0-100.0)   19  05:35    


 


Total Protein  7.2 gm/dL (6.0-8.3)   19  22:45    


 


Albumin  3.4 gm/dL (4.2-5.5)  L  19  22:45    


 


Globulin  3.8 gm/dL  19  22:45    


 


Albumin/Globulin Ratio  0.9  (1.0-1.8)  L  19  22:45    


 


Lipase  21 U/L (11-82)   19  22:45    


 


Vitamin B12  709 pg/mL (232-1245)   19  06:00    


 


Folic Acid  7.5 ng/mL (>3.0)   19  06:00    


 


TSH  4.40 uIU/ml (0.34-5.60)   19  06:00    














- Physical Exam


Vitals and I&O: 


 Vital Signs











Temp  97.5 F   19 15:27


 


Pulse  78   19 15:27


 


Resp  18   19 15:27


 


BP  149/75   19 15:27


 


Pulse Ox  97   19 15:27








 Intake & Output











 19





 18:59 06:59 18:59


 


Intake Total 100 1000 


 


Balance 100 1000 


 


Weight (lbs) 164 lb 164 lb 


 


Intake:   


 


  Intake, IV Amount  1000 


 


    D5-0.9%Ns 1,000 ml @ 80  1000 





    mls/hr IV .[id number] Replaced by [facility]   





    #:[id number]   


 


  Oral 100  


 


Other:   


 


  # Voids 1 2 


 


  # Bowel Movements 0  


 


  Weight Source Bedscale Bedscale 











Active Medications: 


Current Medications





Acetaminophen (Tylenol)  650 mg PO Q4H PRN


   PRN Reason: Pain Or Fever above 101


   Stop: 19 23:43


Al Hydrox/Mg Hydrox/Simethicone (Maalox)  30 ml PO Q6H PRN


   PRN Reason: Dyspepsia


   Stop: 19 23:43


Albuterol Sulfate (Albuterol 2.5mg/3ml Neb Ud)  2.5 mg HHN Q2HRT PRN


   PRN Reason: Shortness of Breath or Wheeze


   Stop: 19 23:43


Amlodipine Besylate (Norvasc)  10 mg PO DAILY Replaced by [facility]


   Stop: 19 08:59


   Last Admin: 06/22/19 10:26 Dose:  Not Given


Aspirin (Aspirin Chewable)  81 mg PO DAILY Replaced by [facility]


   Stop: 19 08:59


   Last Admin: 19 10:26 Dose:  Not Given


Dextrose (D50w)  50 ml IVP PRN PRN


   PRN Reason: Blood Glucose less than 70


   Stop: 19 23:45


Dextrose (Glutose 40%)  18.75 gm PO PRN PRN


   PRN Reason: Blood Glucose less than 70


   Stop: 19 23:45


Donepezil HCl (Aricept)  5 mg PO HS Replaced by [facility]


   Stop: 19 20:59


   Last Admin: 19 21:25 Dose:  Not Given


Glucagon (Glucagen)  1 mg IM PRN PRN


   PRN Reason: Blood Glucose less than 70


   Stop: 19 23:45


Guaifenesin (Robitussin)  200 mg PO Q4HR PRN


   PRN Reason: Cough or Congestion


   Stop: 19 23:43


Haloperidol Lactate (Haldol)  2 mg PO BID Replaced by [facility]; Protocol


   Stop: 19 13:59


   Last Admin: 19 14:45 Dose:  Not Given


Haloperidol Lactate (Haldol)  1 mg IM BID PRN


   PRN Reason: Agitation


   Stop: 19 14:33


Dextrose/Sodium Chloride (D5-0.9%Ns)  1,000 mls @ 80 mls/hr IV .[id number] Replaced by [facility]


   Stop: 19 01:59


   Last Admin: 19 21:17 Dose:  80 mls/hr


Insulin Human Lispro (Humalog Insulin Sliding Scale)  0 units SUBQ ACHS Replaced by [facility]; 

Protocol


   Stop: 19 07:29


   Last Admin: 19 12:01 Dose:  Not Given


Ipratropium Bromide (Atrovent Neb 0.5mg/2.5ml)  0.5 mg HHN Q2HRT PRN


   PRN Reason: Shortness of Breath or Wheeze


   Stop: 19 23:43


Lorazepam (Ativan)  0.5 mg PO Q8HR PRN; Protocol


   PRN Reason: Agitation


   Stop: 19 23:46


Megestrol Acetate (Megace)  400 mg PO BID Replaced by [facility]; Protocol


   Stop: 19 16:59


   Last Admin: 19 10:27 Dose:  Not Given


Mupirocin (Bactroban Oint)  1 appl NS BID Replaced by [facility]


   Stop: 19 09:01


   Last Admin: 19 10:27 Dose:  Not Given


Nitroglycerin (Nitrostat)  0.4 mg SL Q5MIN PRN


   PRN Reason: Chest Pain


   Stop: 19 23:43


Ondansetron HCl (Zofran)  4 mg IV Q8H PRN


   PRN Reason: Nausea / Vomiting


   Stop: 19 23:43


Propranolol HCl (Inderal)  10 mg PO BID Replaced by [facility]


   Stop: 19 08:59


   Last Admin: 19 10:27 Dose:  Not Given


Simvastatin (Zocor)  20 mg PO DAILY Replaced by [facility]; Protocol


   Stop: 19 08:59


   Last Admin: 19 10:27 Dose:  Not Given


Tamsulosin HCl (Flomax)  0.8 mg PO DAILY Replaced by [facility]


   Stop: 19 08:59


   Last Admin: 19 10:27 Dose:  Not Given








General: demented, thin


HEENT: NC/AT, PERRLA, EOMI


Neck: Supple, No JVD


Lungs: congested, rales


Cardiovascular: RRR, Normal S1, Normal S2, with murmur


Abdomen: soft, thin, globular, positive bowel sound


Extremities: excoriation, contracture


Neurological: no change, disorganized





Internal Medicine Assmt/Plan





- Assessment


Assessment: 





Failure to thrive, dehydration, diabetes, hypertension, BPH, CAD,


cardiac arrhythmia, hypercholesterolemia, moderate protein calorie malnutrition,


poor p.o. intake.  chronic stroke














- Plan


Plan: 





hospice eval as per wife wishes 


supportive care


continue current plan of care 





Nutritional Asmnt/Malnutr-PDOC





- Dietary Evaluation


Malnutrition Findings (Please click <Entered> for more info): 








Nutritional Asmnt/Malnutrition                             Start:  19 15:

11


Text:                                                      Status: Active      

  


Freq:                                                                          

  


Protocol:                                                                      

  


 Document     19 15:11  KARISHMA  (Rec: 19 15:20  KARISHMA [id number])


 Nutritional Asmnt/Malnutrition


     Patient General Information


      Nutritional Screening                      Moderate Risk


      Diagnosis                                  Dehydration, Generalized


                                                 weakness, Agitation


      Pertinent Medical Hx/Surgical Hx           HTN, DM, CAD, Dyslipidemia (


                                                 hyperlipidemia), Arthritis,


                                                 BPH


      Subjective Information                     A 84 years old male was


                                                 admitted from SNF d/t


                                                 Dehydration, Generalized


                                                 weakness and Agitation. Pt is


                                                 currently on [facility] diet


                                                 providing 1967 Kcal and 94 g


                                                 protein. Per chart, Pt refused


                                                 to eat, medications and


                                                 accucheck.  Per chart, PO


                                                 intake 0% for all meals on .


      Current Diet Order/ Nutrition Support      [facility]


      Pertinent Medications                      Maalox, D50W, Glutose 40%, D5-


                                                 0.9%ns, Glucagen, INS-SS


      Pertinent Labs                              POC Glu 144


                                                  , POTASS 2.9, Glu


                                                 144, POC Glu 127, 135, 153


     Nutritional Hx/Data


      Height                                     5 ft 8 in


      Height (Calculated Centimeters)            172.7


      Current Weight (lbs)                       164 lb


      Weight (Calculated Kilograms)              74.4


      Weight (Calculated Grams)                  09286.1


      Ideal Body Weight                          68.4 kg


      Body Mass Index (BMI)                      24.9


      Weight Status                              Approriate


     GI Symptoms


      GI Symptoms                                None


      Last BM                                    


      Difficult in:                              None


      Skin Integrity/Comment:                    Servando 11, Skin Intact


      Current %PO                                Negligible < 25%


     Estimated Nutritional Goals


      BEE in Kcals:                              Using Current wt


      Calories/Kcals/Kg                          25-30 Kcal/kg


      Kcals Calculated                           5750-2431 Kcal


      Protein:                                   Using Current wt


      Protein g/k-1.2g/kg


      Protein Calculated                         74-90 g


      Fluid: ml                                  9041-3753 ml (1ml/kcal)


     Nutritional Problem


      2. Problem


       Problem                                   Altered nutrition related lab


                                                 result


       Etiology                                  medical condition


       Signs/Symptoms:                           lab result Glucose 144 and POC


                                                 glucose ranging 127-153


      1. Problem


       Problem                                   Inadequate oral intake


       Etiology                                  possible low appetite and pt


                                                 refuse to eat


       Signs/Symptoms:                           PO intake 0% for all meals on


                                                 


     Malnutrition Related to Morbid Obesity


      Malnutrition related to morbid obesity     No


     Intervention/Recommendation


      Comments                                   1. Continue with [facility] diet as


                                                 ordered.


                                                 2. RN to encourage PO intake.


                                                 3. Monitor PO intake, wt, labs


                                                 and skin integrity


                                                 4. F/U as HR in 1-2 days


     Expected Outcomes/Goals


      Expected Outcomes/Goals                    1. Pt to meet at least 75% of


                                                 nutritional needs via


                                                 nutrition support with


                                                 tolerance


                                                 2. Wt stability, skin to


                                                 remain intact, labs to


                                                 approach WNL.

## 2019-06-23 RX ADMIN — HALOPERIDOL LACTATE SCH: 2 SOLUTION, CONCENTRATE ORAL at 18:29

## 2019-06-23 RX ADMIN — INSULIN LISPRO SCH: 100 INJECTION, SOLUTION INTRAVENOUS; SUBCUTANEOUS at 06:36

## 2019-06-23 RX ADMIN — INSULIN LISPRO SCH: 100 INJECTION, SOLUTION INTRAVENOUS; SUBCUTANEOUS at 12:38

## 2019-06-23 RX ADMIN — HALOPERIDOL LACTATE SCH: 2 SOLUTION, CONCENTRATE ORAL at 10:26

## 2019-06-23 RX ADMIN — INSULIN LISPRO SCH: 100 INJECTION, SOLUTION INTRAVENOUS; SUBCUTANEOUS at 18:24

## 2019-06-23 RX ADMIN — INSULIN LISPRO SCH: 100 INJECTION, SOLUTION INTRAVENOUS; SUBCUTANEOUS at 20:41

## 2019-06-23 RX ADMIN — ASPIRIN 81 MG SCH: 81 TABLET ORAL at 10:26

## 2019-06-23 NOTE — CONSULTATION
DATE OF CONSULTATION:     06/22/2019



REQUESTING PHYSICIAN:  Jesus Dotson M.D.



TYPE OF CONSULTATION:  Psychology.



CONSULTING PSYCHOLOGIST:  Sameer Green, Ph.D.



REASON FOR CONSULTATION:  The patient is an 84-year-old   

male who is being referred for a psychology consultation in order to evaluate 
the

patient for depression and make recommendations based on his mental health

disposition.  The patient has been refusing all treatment.  The interest and

concern is whether there are any psychological factors or barriers that are

possibly complicating the patient's optimal participation in his treatment.



HISTORY OF PRESENT ILLNESS:  The following is by the assessment methods of

review of medical records, including history and physical, nursing notes, MD

progress notes, staff consultation and interview with the patient in his room. 

The patient is an 84-year-old   male.  The patient's wife,

Francy, is involved in his care.  The patient was admitted from a psychiatric

hospital, i.e., [facility], due to a history of dementia with possible current

psychosis, depression and failure to thrive.  The patient was admitted on the 

medical floor.  Staff reports the patient has been refusing medications, Accu-
Cheks, 

and IV insertion.  The patient has been refusing to eat and drink.  The patient 
has

multiple medical problems and had been getting severely dehydrated.  The

patient's medical problems include diabetes, hypertension, hypercholesterolemia,

BPH, CAD and cardiac arrhythmia.  The patient's wife has been here and has

spoken with the staff regarding the patient to be evaluated for hospice.  The

patient's status has been changed to DNR according to the staff.



PAST PSYCHIATRIC HISTORY:  The patient was previously admitted to [facility] and was transferred here for medical stabilization.  The patient has been 
a

resident of [facility].  According to the record, the patient 
is 

under the care of a psychiatrist and psychologist at his placement.



PAST MEDICAL HISTORY:  Please see history and physical by Dr. Andrew.



SUBSTANCE ABUSE HISTORY:  The patient did not answer these questions.  There 

is nothing in the record to indicate past use or consumption of alcohol, tobacco
, 

or any drug use.



BRIEF PSYCHOSOCIAL HISTORY:  According to record review, the patient is 
  

and the patient's wife, Francy, is very supportive and involved in his care.  
The 

patient did not answer any questions about occupational or educational history 

or Zoroastrianism affiliation.  The patient did not answer questions about current 
legal 

problems or any history of abuse.  The patient did not answer questions about 

other family, friends or others involved in his care.



MENTAL STATUS EXAMINATION:  The patient is arousable only by light touch.  

The patient is somnolent.  The patient stated that he wanted to be "left alone"
.  

The patient did not participate in the rest of the clinical interview so a Mini 
Mental 

Status Exam (MMSE) was performed.  The following is by review of the medical 
record, staff 

report and direct observation.  The patient appears to be his stated age.  The 
patient 

is somnolent and barely arousable and only by light touch.  Speech is slow and 
soft.  

The patient's mood is irritable.  Affect is blunted.  Eye contact is poor.  The 
patient stated 

again, that he wanted to be "left alone".  The patient did not answer the 
question about 

experiencing any suicidal ideation, plan or intention or any wish to die.  The 
patient has poor

insight into his illness.  The patient did not answer questions about thought 
content, i.e., 

delusions or hallucinations.  The patient's behavior has been refusing all 
treatment, i.e., 

medications, food, water or any other medical protocols.  The patient's impulse 
control is limited.   

Staff reports there was an order for Haldol, if the patient becomes agitated; 
however, the record 

indicates there has been no need to use IM Haldol.  The patient is totally 
withdrawn and 

isolative.  Unable to assess the following:  The patient did not participate in 
the interpretation 

of proverbs.  The patient did not participate in the memory assessment.  The 
patient did not 

answer any questions about milestones or family history or personal history.  
Unable to assess 

concentration.  Sensorium is alert and oriented to self and place (in a hospital
).  Insight is impaired.  

Judgment is impaired.  The patient's capacity to make a reasonable decision 
about his care

is of major concern.



DIAGNOSTIC IMPRESSION:  AXIS I:  Provisional diagnosis of Major Depressive 
Disorder, 

recurrent, severe.

AXIS II:  deferred

AXIS III:  per Dr. Andrew.



TREATMENT PLAN:  The patient is declining any psychological services or any

psychotherapeutic interventions to assist the patient in accepting treatment. 

The patient is refusing all attempts towards his care.  The patient is also

refusing to eat or drink and has pulled out the IV lines in the past.  This

provider discussed with the nurse on staff, Jin, that a consultation with the

attending physician, Dr. Andrew, and attending psychiatrist, Dr. Dotson is 

forthcoming regarding the patient's care. 

 The patient's status has been changed to DNR, according to the patient's wife. 

Recommendations for evaluation for hospice were also requested by the patient's

wife.  There is no followup indicated for psychology services as a standard 
treatment

protocol and approach.  The patient was, of course, encouraged to comply with

the treatment.  The patient requested to be left alone and continues to refuse

all care.



Thank you, Dr. Dotson and Dr. Andrew, for this consult and the opportunity

to participate in this patient's care.





DD: 06/23/2019 10:43

DT: 06/23/2019 22:53

JOB# [id number]  [id number]

BEREKET

## 2019-06-23 NOTE — INTERNAL MEDICINE PROG NOTE
Internal Medicine Subjective





- Subjective


Service Date: 19 (still refusing labs, medications, and oral diet.)


Patient is:: awake, in bed, talking, confused


Per staff patient has:: poor appetite, poor oral intake, noncompliant, refusing 

care, refusing labs





Internal Medicine Objective





- Results


Result Diagrams: 


 19 05:35





 19 05:35


Recent Labs: 


 Laboratory Last Values











WBC  8.9 Th/cmm (4.8-10.8)   19  05:35    


 


RBC  4.87 Mil/cmm (3.80-5.80)   19  05:35    


 


Hgb  13.9 gm/dL (12-16)   19  05:35    


 


Hct  41.7 % (41.0-60)   19  05:35    


 


MCV  85.6 fl (80-99)   19  05:35    


 


MCH  28.5 pg (27.0-31.0)   19  05:35    


 


MCHC Differential  33.4 pg (28.0-36.0)   19  05:35    


 


RDW  15.7 % (11.5-20.0)   19  05:35    


 


Plt Count  176 Th/cmm (150-400)   19  05:35    


 


MPV  10.3 fl  19  05:35    


 


Add Manual Diff     19  22:45    


 


Neutrophils %  69.2 % (40.0-80.0)   19  05:35    


 


Lymphocytes %  22.5 % (20.0-50.0)   19  05:35    


 


Monocytes %  3.5 % (2.0-10.0)   19  05:35    


 


Eosinophils %  3.8 % (0.0-5.0)   19  05:35    


 


Basophils %  1.0 % (0.0-2.0)   19  05:35    


 


PT  10.6 SECONDS (9.5-11.5)   19  22:45    


 


INR  1.02  (0.5-1.4)   19  22:45    


 


PTT (Actin FS)  31.9 SECONDS (26.0-38.0)   19  22:45    


 


Sodium  135 mEq/L (136-145)  L  19  05:35    


 


Potassium  2.9 mEq/L (3.5-5.1)  L*  19  05:35    


 


Chloride  103 mEq/L ()   19  05:35    


 


Carbon Dioxide  20.9 mEq/L (21.0-31.0)  L  19  05:35    


 


Anion Gap  14.0  (7.0-16.0)   19  05:35    


 


BUN  7 mg/dL (7-25)   19  05:35    


 


Creatinine  1.0 mg/dL (0.7-1.3)   19  05:35    


 


Est GFR ( Amer)  TNP   19  05:35    


 


Est GFR (Non-Af Amer)  TNP   19  05:35    


 


BUN/Creatinine Ratio  7.0   19  05:35    


 


Glucose  130 mg/dL ()  H  19  05:35    


 


POC Glucose  153 MG/DL (70 - 105)  H  19  20:21    


 


Calcium  9.0 mg/dL (8.6-10.3)   19  05:35    


 


Magnesium  2.1 mg/dL (1.9-2.7)   19  05:35    


 


Total Bilirubin  0.7 mg/dL (0.3-1.0)   19  22:45    


 


AST  12 U/L (13-39)  L  19  22:45    


 


ALT  25 U/L (7-52)   19  22:45    


 


Alkaline Phosphatase  75 U/L ()   19  22:45    


 


Ammonia  35 umol/L (16-53)   19  06:00    


 


Creatine Kinase  27 U/L ()  L  19  22:45    


 


Troponin I  0.02 ng/mL (0.01-0.05)   19  22:45    


 


B-Natriuretic Peptide  71.7 pg/mL (5.0-100.0)   19  05:35    


 


Total Protein  7.2 gm/dL (6.0-8.3)   19  22:45    


 


Albumin  3.4 gm/dL (4.2-5.5)  L  19  22:45    


 


Globulin  3.8 gm/dL  19  22:45    


 


Albumin/Globulin Ratio  0.9  (1.0-1.8)  L  19  22:45    


 


Lipase  21 U/L (11-82)   19  22:45    


 


Vitamin B12  709 pg/mL (232-1245)   19  06:00    


 


Folic Acid  7.5 ng/mL (>3.0)   19  06:00    


 


TSH  4.40 uIU/ml (0.34-5.60)   19  06:00    














- Physical Exam


Vitals and I&O: 


 Vital Signs











Temp  97.9 F   19 15:43


 


Pulse  84   19 15:43


 


Resp  18   19 15:43


 


BP  120/63   19 15:43


 


Pulse Ox  96   19 15:43








 Intake & Output











 19





 18:59 06:59 18:59


 


Intake Total 50 100 


 


Output Total  100 


 


Balance 50 0 


 


Weight (lbs) 164 lb 164 lb 


 


Intake:   


 


  Oral 50 100 


 


Output:   


 


  Emesis  100 


 


Other:   


 


  # Voids 2 2 


 


  # Bowel Movements  0 


 


  Weight Source Bedscale Bedscale 











Active Medications: 


Current Medications





Acetaminophen (Tylenol)  650 mg PO Q4H PRN


   PRN Reason: Pain Or Fever above 101


   Stop: 19 23:43


Al Hydrox/Mg Hydrox/Simethicone (Maalox)  30 ml PO Q6H PRN


   PRN Reason: Dyspepsia


   Stop: 19 23:43


Albuterol Sulfate (Albuterol 2.5mg/3ml Neb Ud)  2.5 mg HHN Q2HRT PRN


   PRN Reason: Shortness of Breath or Wheeze


   Stop: 19 23:43


Amlodipine Besylate (Norvasc)  10 mg PO DAILY [facility]


   Stop: 19 08:59


   Last Admin: 19 10:26 Dose:  Not Given


Aspirin (Aspirin Chewable)  81 mg PO DAILY [facility]


   Stop: 19 08:59


   Last Admin: 19 10:26 Dose:  Not Given


Dextrose (D50w)  50 ml IVP PRN PRN


   PRN Reason: Blood Glucose less than 70


   Stop: 19 23:45


Dextrose (Glutose 40%)  18.75 gm PO PRN PRN


   PRN Reason: Blood Glucose less than 70


   Stop: 19 23:45


Donepezil HCl (Aricept)  5 mg PO HS [facility]


   Stop: 19 20:59


   Last Admin: 19 20:12 Dose:  Not Given


Glucagon (Glucagen)  1 mg IM PRN PRN


   PRN Reason: Blood Glucose less than 70


   Stop: 19 23:45


Guaifenesin (Robitussin)  200 mg PO Q4HR PRN


   PRN Reason: Cough or Congestion


   Stop: 19 23:43


Haloperidol Lactate (Haldol)  2 mg PO BID [facility]; Protocol


   Stop: 19 13:59


   Last Admin: 19 10:26 Dose:  Not Given


Haloperidol Lactate (Haldol)  1 mg IM BID PRN


   PRN Reason: Agitation


   Stop: 19 14:33


Dextrose/Sodium Chloride (D5-0.9%Ns)  1,000 mls @ 80 mls/hr IV .[id number] [facility]


   Stop: 19 01:59


   Last Admin: 19 21:17 Dose:  80 mls/hr


Insulin Human Lispro (Humalog Insulin Sliding Scale)  0 units SUBQ ACHS [facility]; 

Protocol


   Stop: 19 07:29


   Last Admin: 19 12:38 Dose:  Not Given


Ipratropium Bromide (Atrovent Neb 0.5mg/2.5ml)  0.5 mg HHN Q2HRT PRN


   PRN Reason: Shortness of Breath or Wheeze


   Stop: 19 23:43


Lorazepam (Ativan)  0.5 mg PO Q8HR PRN; Protocol


   PRN Reason: Agitation


   Stop: 19 23:46


Megestrol Acetate (Megace)  400 mg PO BID [facility]; Protocol


   Stop: 19 16:59


   Last Admin: 19 10:26 Dose:  Not Given


Mupirocin (Bactroban Oint)  1 appl NS BID [facility]


   Stop: 19 09:01


   Last Admin: 19 10:26 Dose:  Not Given


Nitroglycerin (Nitrostat)  0.4 mg SL Q5MIN PRN


   PRN Reason: Chest Pain


   Stop: 19 23:43


Ondansetron HCl (Zofran)  4 mg IV Q8H PRN


   PRN Reason: Nausea / Vomiting


   Stop: 19 23:43


Propranolol HCl (Inderal)  10 mg PO BID [facility]


   Stop: 19 08:59


   Last Admin: 19 10:27 Dose:  Not Given


Simvastatin (Zocor)  20 mg PO DAILY [facility]; Protocol


   Stop: 19 08:59


   Last Admin: 19 10:27 Dose:  Not Given


Tamsulosin HCl (Flomax)  0.8 mg PO DAILY [facility]


   Stop: 19 08:59


   Last Admin: 19 10:27 Dose:  Not Given








General: demented, thin


HEENT: NC/AT, PERRLA, EOMI


Neck: Supple, No JVD


Lungs: CTAB


Cardiovascular: RRR, Normal S1, Normal S2, with murmur


Abdomen: soft, thin, globular, positive bowel sound


Extremities: excoriation, contracture


Neurological: no change, disorganized





Internal Medicine Assmt/Plan





- Assessment


Assessment: 





Failure to thrive, dehydration, diabetes, hypertension, BPH, CAD,


cardiac arrhythmia, hypercholesterolemia, moderate protein calorie malnutrition,


poor p.o. intake.  chronic stroke














- Plan


Plan: 





hospice eval 


encourage patient to eat and drink 


supportive care


continue current plan of care 





Nutritional Asmnt/Malnutr-PDOC





- Dietary Evaluation


Malnutrition Findings (Please click <Entered> for more info): 








Nutritional Asmnt/Malnutrition                             Start:  19 15:

11


Text:                                                      Status: Active      

  


Freq:                                                                          

  


Protocol:                                                                      

  


 Document     19 15:11  KRAISHMA  (Rec: 19 15:20  [id number] [id number])


 Nutritional Asmnt/Malnutrition


     Patient General Information


      Nutritional Screening                      Moderate Risk


      Diagnosis                                  Dehydration, Generalized


                                                 weakness, Agitation


      Pertinent Medical Hx/Surgical Hx           HTN, DM, CAD, Dyslipidemia (


                                                 hyperlipidemia), Arthritis,


                                                 BPH


      Subjective Information                     A 84 years old male was


                                                 admitted from SNF d/t


                                                 Dehydration, Generalized


                                                 weakness and Agitation. Pt is


                                                 currently on [facility] diet


                                                 providing 1967 Kcal and 94 g


                                                 protein. Per chart, Pt refused


                                                 to eat, medications and


                                                 accucheck.  Per chart, PO


                                                 intake 0% for all meals on .


      Current Diet Order/ Nutrition Support      [facility]


      Pertinent Medications                      Maalox, D50W, Glutose 40%, D5-


                                                 0.9%ns, Glucagen, INS-SS


      Pertinent Labs                              POC Glu 144


                                                  , POTASS 2.9, Glu


                                                 144, POC Glu 127, 135, 153


     Nutritional Hx/Data


      Height                                     5 ft 8 in


      Height (Calculated Centimeters)            172.7


      Current Weight (lbs)                       164 lb


      Weight (Calculated Kilograms)              74.4


      Weight (Calculated Grams)                  85146.1


      Ideal Body Weight                          68.4 kg


      Body Mass Index (BMI)                      24.9


      Weight Status                              Approriate


     GI Symptoms


      GI Symptoms                                None


      Last BM                                    


      Difficult in:                              None


      Skin Integrity/Comment:                    Servando 11, Skin Intact


      Current %PO                                Negligible < 25%


     Estimated Nutritional Goals


      BEE in Kcals:                              Using Current wt


      Calories/Kcals/Kg                          25-30 Kcal/kg


      Kcals Calculated                           7592-8398 Kcal


      Protein:                                   Using Current wt


      Protein g/k-1.2g/kg


      Protein Calculated                         74-90 g


      Fluid: ml                                  4185-3602 ml (1ml/kcal)


     Nutritional Problem


      2. Problem


       Problem                                   Altered nutrition related lab


                                                 result


       Etiology                                  medical condition


       Signs/Symptoms:                           lab result Glucose 144 and POC


                                                 glucose ranging 127-153


      1. Problem


       Problem                                   Inadequate oral intake


       Etiology                                  possible low appetite and pt


                                                 refuse to eat


       Signs/Symptoms:                           PO intake 0% for all meals on


                                                 


     Malnutrition Related to Morbid Obesity


      Malnutrition related to morbid obesity     No


     Intervention/Recommendation


      Comments                                   1. Continue with [facility] diet as


                                                 ordered.


                                                 2. RN to encourage PO intake.


                                                 3. Monitor PO intake, wt, labs


                                                 and skin integrity


                                                 4. F/U as HR in 1-2 days


     Expected Outcomes/Goals


      Expected Outcomes/Goals                    1. Pt to meet at least 75% of


                                                 nutritional needs via


                                                 nutrition support with


                                                 tolerance


                                                 2. Wt stability, skin to


                                                 remain intact, labs to


                                                 approach WNL.

## 2019-06-24 RX ADMIN — INSULIN LISPRO SCH: 100 INJECTION, SOLUTION INTRAVENOUS; SUBCUTANEOUS at 06:31

## 2019-06-24 RX ADMIN — INSULIN LISPRO SCH: 100 INJECTION, SOLUTION INTRAVENOUS; SUBCUTANEOUS at 16:11

## 2019-06-24 RX ADMIN — INSULIN LISPRO SCH: 100 INJECTION, SOLUTION INTRAVENOUS; SUBCUTANEOUS at 11:27

## 2019-06-24 RX ADMIN — INSULIN LISPRO SCH: 100 INJECTION, SOLUTION INTRAVENOUS; SUBCUTANEOUS at 21:36

## 2019-06-24 RX ADMIN — HALOPERIDOL LACTATE SCH: 2 SOLUTION, CONCENTRATE ORAL at 09:11

## 2019-06-24 RX ADMIN — ASPIRIN 81 MG SCH: 81 TABLET ORAL at 09:11

## 2019-06-24 RX ADMIN — HALOPERIDOL LACTATE SCH: 2 SOLUTION, CONCENTRATE ORAL at 16:26

## 2019-06-24 NOTE — INTERNAL MEDICINE PROG NOTE
Internal Medicine Subjective





- Subjective


Patient seen and examined:: with staff, chart reviewed


Patient is:: awake, in bed, talking, confused


Patient Complaints of:: congestion, unable to sleep


Per staff patient has:: poor appetite, poor oral intake, noncompliant, refusing 

care, refusing labs





Internal Medicine Objective





- Results


Result Diagrams: 


 19 05:35





 19 05:35


Recent Labs: 


 Laboratory Last Values











WBC  8.9 Th/cmm (4.8-10.8)   19  05:35    


 


RBC  4.87 Mil/cmm (3.80-5.80)   19  05:35    


 


Hgb  13.9 gm/dL (12-16)   19  05:35    


 


Hct  41.7 % (41.0-60)   19  05:35    


 


MCV  85.6 fl (80-99)   19  05:35    


 


MCH  28.5 pg (27.0-31.0)   19  05:35    


 


MCHC Differential  33.4 pg (28.0-36.0)   19  05:35    


 


RDW  15.7 % (11.5-20.0)   19  05:35    


 


Plt Count  176 Th/cmm (150-400)   19  05:35    


 


MPV  10.3 fl  19  05:35    


 


Add Manual Diff     19  22:45    


 


Neutrophils %  69.2 % (40.0-80.0)   19  05:35    


 


Lymphocytes %  22.5 % (20.0-50.0)   19  05:35    


 


Monocytes %  3.5 % (2.0-10.0)   19  05:35    


 


Eosinophils %  3.8 % (0.0-5.0)   19  05:35    


 


Basophils %  1.0 % (0.0-2.0)   19  05:35    


 


PT  10.6 SECONDS (9.5-11.5)   19  22:45    


 


INR  1.02  (0.5-1.4)   19  22:45    


 


PTT (Actin FS)  31.9 SECONDS (26.0-38.0)   19  22:45    


 


Sodium  135 mEq/L (136-145)  L  19  05:35    


 


Potassium  2.9 mEq/L (3.5-5.1)  L*  19  05:35    


 


Chloride  103 mEq/L ()   19  05:35    


 


Carbon Dioxide  20.9 mEq/L (21.0-31.0)  L  19  05:35    


 


Anion Gap  14.0  (7.0-16.0)   19  05:35    


 


BUN  7 mg/dL (7-25)   19  05:35    


 


Creatinine  1.0 mg/dL (0.7-1.3)   19  05:35    


 


Est GFR ( Amer)  TNP   19  05:35    


 


Est GFR (Non-Af Amer)  TNP   19  05:35    


 


BUN/Creatinine Ratio  7.0   19  05:35    


 


Glucose  130 mg/dL ()  H  19  05:35    


 


POC Glucose  153 MG/DL (70 - 105)  H  19  20:21    


 


Calcium  9.0 mg/dL (8.6-10.3)   19  05:35    


 


Magnesium  2.1 mg/dL (1.9-2.7)   19  05:35    


 


Total Bilirubin  0.7 mg/dL (0.3-1.0)   19  22:45    


 


AST  12 U/L (13-39)  L  19  22:45    


 


ALT  25 U/L (7-52)   19  22:45    


 


Alkaline Phosphatase  75 U/L ()   19  22:45    


 


Ammonia  35 umol/L (16-53)   19  06:00    


 


Creatine Kinase  27 U/L ()  L  19  22:45    


 


Troponin I  0.02 ng/mL (0.01-0.05)   19  22:45    


 


B-Natriuretic Peptide  71.7 pg/mL (5.0-100.0)   19  05:35    


 


Total Protein  7.2 gm/dL (6.0-8.3)   19  22:45    


 


Albumin  3.4 gm/dL (4.2-5.5)  L  19  22:45    


 


Globulin  3.8 gm/dL  19  22:45    


 


Albumin/Globulin Ratio  0.9  (1.0-1.8)  L  19  22:45    


 


Lipase  21 U/L (11-82)   19  22:45    


 


Vitamin B12  709 pg/mL (232-1245)   19  06:00    


 


Folic Acid  7.5 ng/mL (>3.0)   19  06:00    


 


TSH  4.40 uIU/ml (0.34-5.60)   19  06:00    














- Physical Exam


Vitals and I&O: 


 Vital Signs











Temp  98.2 F   19 04:00


 


Pulse  78   19 07:45


 


Resp  20   19 08:00


 


BP  125/77   19 04:00


 


Pulse Ox  96   19 07:45








 Intake & Output











 19





 18:59 06:59 18:59


 


Intake Total 100 500 


 


Balance 100 500 


 


Weight (lbs) 74.389 kg 74.389 kg 


 


Intake:   


 


  Oral 100 500 


 


Other:   


 


  # Voids 2 3 


 


  # Bowel Movements 0  


 


  Weight Source Bedscale Bedscale 











Active Medications: 


Current Medications





Acetaminophen (Tylenol)  650 mg PO Q4H PRN


   PRN Reason: Pain Or Fever above 101


   Stop: 19 23:43


   Last Admin: 19 00:41 Dose:  650 mg


Al Hydrox/Mg Hydrox/Simethicone (Maalox)  30 ml PO Q6H PRN


   PRN Reason: Dyspepsia


   Stop: 19 23:43


Albuterol Sulfate (Albuterol 2.5mg/3ml Neb Ud)  2.5 mg HHN Q2HRT PRN


   PRN Reason: Shortness of Breath or Wheeze


   Stop: 19 23:43


Amlodipine Besylate (Norvasc)  10 mg PO DAILY [facility]


   Stop: 19 08:59


   Last Admin: 19 09:11 Dose:  Not Given


Aspirin (Aspirin Chewable)  81 mg PO DAILY NISHA


   Stop: 19 08:59


   Last Admin: 19 09:11 Dose:  Not Given


Dextrose (D50w)  50 ml IVP PRN PRN


   PRN Reason: Blood Glucose less than 70


   Stop: 19 23:45


Dextrose (Glutose 40%)  18.75 gm PO PRN PRN


   PRN Reason: Blood Glucose less than 70


   Stop: 19 23:45


Donepezil HCl (Aricept)  5 mg PO HS [facility]


   Stop: 19 20:59


   Last Admin: 19 20:41 Dose:  Not Given


Glucagon (Glucagen)  1 mg IM PRN PRN


   PRN Reason: Blood Glucose less than 70


   Stop: 19 23:45


Guaifenesin (Robitussin)  200 mg PO Q4HR PRN


   PRN Reason: Cough or Congestion


   Stop: 19 23:43


Haloperidol Lactate (Haldol)  2 mg PO BID [facility]; Protocol


   Stop: 19 13:59


   Last Admin: 19 09:11 Dose:  Not Given


Haloperidol Lactate (Haldol)  1 mg IM BID PRN


   PRN Reason: Agitation


   Stop: 19 14:33


Dextrose/Sodium Chloride (D5-0.9%Ns)  1,000 mls @ 80 mls/hr IV .[id number] [facility]


   Stop: 19 01:59


   Last Admin: 19 21:17 Dose:  80 mls/hr


Insulin Human Lispro (Humalog Insulin Sliding Scale)  0 units SUBQ ACHS [facility]; 

Protocol


   Stop: 19 07:29


   Last Admin: 19 11:27 Dose:  Not Given


Ipratropium Bromide (Atrovent Neb 0.5mg/2.5ml)  0.5 mg HHN Q2HRT PRN


   PRN Reason: Shortness of Breath or Wheeze


   Stop: 19 23:43


Lorazepam (Ativan)  0.5 mg PO Q8HR PRN; Protocol


   PRN Reason: Agitation


   Stop: 19 23:46


Megestrol Acetate (Megace)  400 mg PO BID [facility]; Protocol


   Stop: 19 16:59


   Last Admin: 19 09:11 Dose:  Not Given


Mupirocin (Bactroban Oint)  1 appl NS BID [facility]


   Stop: 19 09:01


   Last Admin: 19 09:12 Dose:  Not Given


Nitroglycerin (Nitrostat)  0.4 mg SL Q5MIN PRN


   PRN Reason: Chest Pain


   Stop: 19 23:43


Ondansetron HCl (Zofran)  4 mg IV Q8H PRN


   PRN Reason: Nausea / Vomiting


   Stop: 19 23:43


Propranolol HCl (Inderal)  10 mg PO BID [facility]


   Stop: 19 08:59


   Last Admin: 19 09:12 Dose:  Not Given


Simvastatin (Zocor)  20 mg PO DAILY [facility]; Protocol


   Stop: 19 08:59


   Last Admin: 19 09:12 Dose:  Not Given


Tamsulosin HCl (Flomax)  0.8 mg PO DAILY [facility]


   Stop: 19 08:59


   Last Admin: 19 09:12 Dose:  Not Given








General: demented, thin


HEENT: NC/AT, PERRLA, EOMI


Neck: Supple, No JVD


Lungs: CTAB


Cardiovascular: RRR, Normal S1, Normal S2, with murmur


Abdomen: soft, thin, globular, positive bowel sound


Extremities: excoriation, contracture


Neurological: no change, disorganized





Internal Medicine Assmt/Plan





- Assessment


Assessment: 





ASSESSMENT:  Failure to thrive, dehydration, diabetes, hypertension, BPH, CAD,


cardiac arrhythmia, hypercholesterolemia, moderate protein calorie malnutrition,


poor p.o. intake.  chronic stroke





- Plan


Plan: 








PLAN:  We will continue the patient on gentle IV hydration.  Monitor for any


signs and symptoms of fluid overload.  We will titrate the patient on diabetic


medications.  Hold Lantus.  Continue with insulin sliding scale.  We will


perform a head CT ___to assess cns status of_ the patient.  We will try to get 

information from the


spouse as well, supposedly she is coming to have a conversation.  Discussed with


nursing staff as well.


pt's wife requesting hospice





Nutritional Asmnt/Malnutr-PDOC





- Dietary Evaluation


Malnutrition Findings (Please click <Entered> for more info): 








Nutritional Asmnt/Malnutrition                             Start:  19 15:

11


Text:                                                      Status: Active      

  


Freq:                                                                          

  


Protocol:                                                                      

  


 Document     19 15:11  MBONUS  (Rec: 19 15:20  MBONUS  [id number])


 Nutritional Asmnt/Malnutrition


     Patient General Information


      Nutritional Screening                      Moderate Risk


      Diagnosis                                  Dehydration, Generalized


                                                 weakness, Agitation


      Pertinent Medical Hx/Surgical Hx           HTN, DM, CAD, Dyslipidemia (


                                                 hyperlipidemia), Arthritis,


                                                 BPH


      Subjective Information                     A 84 years old male was


                                                 admitted from SNF d/t


                                                 Dehydration, Generalized


                                                 weakness and Agitation. Pt is


                                                 currently on [facility] diet


                                                 providing 1967 Kcal and 94 g


                                                 protein. Per chart, Pt refused


                                                 to eat, medications and


                                                 accucheck.  Per chart, PO


                                                 intake 0% for all meals on .


      Current Diet Order/ Nutrition Support      [facility]


      Pertinent Medications                      Maalox, D50W, Glutose 40%, D5-


                                                 0.9%ns, Glucagen, INS-SS


      Pertinent Labs                              POC Glu 144


                                                  , POTASS 2.9, Glu


                                                 144, POC Glu 127, 135, 153


     Nutritional Hx/Data


      Height                                     1.73 m


      Height (Calculated Centimeters)            172.7


      Current Weight (lbs)                       74.389 kg


      Weight (Calculated Kilograms)              74.4


      Weight (Calculated Grams)                  32936.1


      Ideal Body Weight                          68.4 kg


      Body Mass Index (BMI)                      24.9


      Weight Status                              Approriate


     GI Symptoms


      GI Symptoms                                None


      Last BM                                    


      Difficult in:                              None


      Skin Integrity/Comment:                    Servando 11, Skin Intact


      Current %PO                                Negligible < 25%


     Estimated Nutritional Goals


      BEE in Kcals:                              Using Current wt


      Calories/Kcals/Kg                          25-30 Kcal/kg


      Kcals Calculated                           5441-0296 Kcal


      Protein:                                   Using Current wt


      Protein g/k-1.2g/kg


      Protein Calculated                         74-90 g


      Fluid: ml                                  9520-1490 ml (1ml/kcal)


     Nutritional Problem


      2. Problem


       Problem                                   Altered nutrition related lab


                                                 result


       Etiology                                  medical condition


       Signs/Symptoms:                           lab result Glucose 144 and POC


                                                 glucose ranging 127-153


      1. Problem


       Problem                                   Inadequate oral intake


       Etiology                                  possible low appetite and pt


                                                 refuse to eat


       Signs/Symptoms:                           PO intake 0% for all meals on


                                                 


     Malnutrition Related to Morbid Obesity


      Malnutrition related to morbid obesity     No


     Intervention/Recommendation


      Comments                                   1. Continue with [facility] diet as


                                                 ordered.


                                                 2. RN to encourage PO intake.


                                                 3. Monitor PO intake, wt, labs


                                                 and skin integrity


                                                 4. F/U as HR in 1-2 days


     Expected Outcomes/Goals


      Expected Outcomes/Goals                    1. Pt to meet at least 75% of


                                                 nutritional needs via


                                                 nutrition support with


                                                 tolerance


                                                 2. Wt stability, skin to


                                                 remain intact, labs to


                                                 approach WNL.

## 2019-06-25 RX ADMIN — INSULIN LISPRO SCH: 100 INJECTION, SOLUTION INTRAVENOUS; SUBCUTANEOUS at 11:07

## 2019-06-25 RX ADMIN — ASPIRIN 81 MG SCH: 81 TABLET ORAL at 09:11

## 2019-06-25 RX ADMIN — INSULIN LISPRO SCH: 100 INJECTION, SOLUTION INTRAVENOUS; SUBCUTANEOUS at 09:04

## 2019-06-25 RX ADMIN — HALOPERIDOL LACTATE SCH: 2 SOLUTION, CONCENTRATE ORAL at 16:34

## 2019-06-25 RX ADMIN — INSULIN LISPRO SCH: 100 INJECTION, SOLUTION INTRAVENOUS; SUBCUTANEOUS at 16:34

## 2019-06-25 RX ADMIN — HALOPERIDOL LACTATE SCH: 2 SOLUTION, CONCENTRATE ORAL at 09:11

## 2019-06-25 NOTE — INTERNAL MEDICINE PROG NOTE
Internal Medicine Subjective





- Subjective


Patient seen and examined:: with staff, chart reviewed


Patient is:: asleep, in bed, talking, confused, stares blankly


Patient Complaints of:: congestion, unable to sleep


Per staff patient has:: poor appetite, poor oral intake, noncompliant, refusing 

care, refusing labs





Internal Medicine Objective





- Results


Result Diagrams: 


 19 05:35





 19 05:35


Recent Labs: 


 Laboratory Last Values











WBC  8.9 Th/cmm (4.8-10.8)   19  05:35    


 


RBC  4.87 Mil/cmm (3.80-5.80)   19  05:35    


 


Hgb  13.9 gm/dL (12-16)   19  05:35    


 


Hct  41.7 % (41.0-60)   19  05:35    


 


MCV  85.6 fl (80-99)   19  05:35    


 


MCH  28.5 pg (27.0-31.0)   19  05:35    


 


MCHC Differential  33.4 pg (28.0-36.0)   19  05:35    


 


RDW  15.7 % (11.5-20.0)   19  05:35    


 


Plt Count  176 Th/cmm (150-400)   19  05:35    


 


MPV  10.3 fl  19  05:35    


 


Add Manual Diff     19  22:45    


 


Neutrophils %  69.2 % (40.0-80.0)   19  05:35    


 


Lymphocytes %  22.5 % (20.0-50.0)   19  05:35    


 


Monocytes %  3.5 % (2.0-10.0)   19  05:35    


 


Eosinophils %  3.8 % (0.0-5.0)   19  05:35    


 


Basophils %  1.0 % (0.0-2.0)   19  05:35    


 


PT  10.6 SECONDS (9.5-11.5)   19  22:45    


 


INR  1.02  (0.5-1.4)   19  22:45    


 


PTT (Actin FS)  31.9 SECONDS (26.0-38.0)   19  22:45    


 


Sodium  135 mEq/L (136-145)  L  19  05:35    


 


Potassium  2.9 mEq/L (3.5-5.1)  L*  19  05:35    


 


Chloride  103 mEq/L ()   19  05:35    


 


Carbon Dioxide  20.9 mEq/L (21.0-31.0)  L  19  05:35    


 


Anion Gap  14.0  (7.0-16.0)   19  05:35    


 


BUN  7 mg/dL (7-25)   19  05:35    


 


Creatinine  1.0 mg/dL (0.7-1.3)   19  05:35    


 


Est GFR ( Amer)  TNP   19  05:35    


 


Est GFR (Non-Af Amer)  TNP   19  05:35    


 


BUN/Creatinine Ratio  7.0   19  05:35    


 


Glucose  130 mg/dL ()  H  19  05:35    


 


POC Glucose  153 MG/DL (70 - 105)  H  19  20:21    


 


Calcium  9.0 mg/dL (8.6-10.3)   19  05:35    


 


Magnesium  2.1 mg/dL (1.9-2.7)   19  05:35    


 


Total Bilirubin  0.7 mg/dL (0.3-1.0)   19  22:45    


 


AST  12 U/L (13-39)  L  19  22:45    


 


ALT  25 U/L (7-52)   19  22:45    


 


Alkaline Phosphatase  75 U/L ()   19  22:45    


 


Ammonia  35 umol/L (16-53)   19  06:00    


 


Creatine Kinase  27 U/L ()  L  19  22:45    


 


Troponin I  0.02 ng/mL (0.01-0.05)   19  22:45    


 


B-Natriuretic Peptide  71.7 pg/mL (5.0-100.0)   19  05:35    


 


Total Protein  7.2 gm/dL (6.0-8.3)   19  22:45    


 


Albumin  3.4 gm/dL (4.2-5.5)  L  19  22:45    


 


Globulin  3.8 gm/dL  19  22:45    


 


Albumin/Globulin Ratio  0.9  (1.0-1.8)  L  19  22:45    


 


Lipase  21 U/L (11-82)   19  22:45    


 


Vitamin B12  709 pg/mL (232-1245)   19  06:00    


 


Folic Acid  7.5 ng/mL (>3.0)   19  06:00    


 


TSH  4.40 uIU/ml (0.34-5.60)   19  06:00    














- Physical Exam


Vitals and I&O: 


 Vital Signs











Temp  97.6 F   19 08:00


 


Pulse  71   19 09:11


 


Resp  20   19 08:22


 


BP  115/79   19 09:11


 


Pulse Ox  96   19 08:14








 Intake & Output











 19





 18:59 06:59 18:59


 


Intake Total 480 240 


 


Balance 480 240 


 


Weight (lbs) 74.389 kg 73.936 kg 


 


Intake:   


 


  Oral 480 240 


 


Other:   


 


  # Voids 1 2 


 


  # Bowel Movements  0 


 


  Weight Source Bedscale Bedscale 











Active Medications: 


Current Medications





Acetaminophen (Tylenol)  650 mg PO Q4H PRN


   PRN Reason: Pain Or Fever above 101


   Stop: 19 23:43


   Last Admin: 19 00:41 Dose:  650 mg


Al Hydrox/Mg Hydrox/Simethicone (Maalox)  30 ml PO Q6H PRN


   PRN Reason: Dyspepsia


   Stop: 19 23:43


Albuterol Sulfate (Albuterol 2.5mg/3ml Neb Ud)  2.5 mg HHN Q2HRT PRN


   PRN Reason: Shortness of Breath or Wheeze


   Stop: 19 23:43


Amlodipine Besylate (Norvasc)  10 mg PO DAILY [facility]


   Stop: 19 08:59


   Last Admin: 19 09:10 Dose:  Not Given


Aspirin (Aspirin Chewable)  81 mg PO DAILY [facility]


   Stop: 19 08:59


   Last Admin: 19 09:11 Dose:  Not Given


Dextrose (D50w)  50 ml IVP PRN PRN


   PRN Reason: Blood Glucose less than 70


   Stop: 19 23:45


Dextrose (Glutose 40%)  18.75 gm PO PRN PRN


   PRN Reason: Blood Glucose less than 70


   Stop: 19 23:45


Donepezil HCl (Aricept)  5 mg PO HS [facility]


   Stop: 19 20:59


   Last Admin: 19 21:36 Dose:  Not Given


Glucagon (Glucagen)  1 mg IM PRN PRN


   PRN Reason: Blood Glucose less than 70


   Stop: 19 23:45


Guaifenesin (Robitussin)  200 mg PO Q4HR PRN


   PRN Reason: Cough or Congestion


   Stop: 19 23:43


Haloperidol Lactate (Haldol)  2 mg PO BID [facility]; Protocol


   Stop: 19 13:59


   Last Admin: 19 09:11 Dose:  Not Given


Haloperidol Lactate (Haldol)  1 mg IM BID PRN


   PRN Reason: Agitation


   Stop: 19 14:33


Dextrose/Sodium Chloride (D5-0.9%Ns)  1,000 mls @ 80 mls/hr IV .[id number] [facility]


   Stop: 19 01:59


   Last Admin: 19 21:17 Dose:  80 mls/hr


Insulin Human Lispro (Humalog Insulin Sliding Scale)  0 units SUBQ ACHS [facility]; 

Protocol


   Stop: 19 07:29


   Last Admin: 19 11:07 Dose:  Not Given


Ipratropium Bromide (Atrovent Neb 0.5mg/2.5ml)  0.5 mg HHN Q2HRT PRN


   PRN Reason: Shortness of Breath or Wheeze


   Stop: 19 23:43


Lorazepam (Ativan)  0.5 mg PO Q8HR PRN; Protocol


   PRN Reason: Agitation


   Stop: 19 23:46


Megestrol Acetate (Megace)  400 mg PO BID [facility]; Protocol


   Stop: 19 16:59


   Last Admin: 19 09:11 Dose:  Not Given


Nitroglycerin (Nitrostat)  0.4 mg SL Q5MIN PRN


   PRN Reason: Chest Pain


   Stop: 08/17/19 23:43


Ondansetron HCl (Zofran)  4 mg IV Q8H PRN


   PRN Reason: Nausea / Vomiting


   Stop: 19 23:43


Propranolol HCl (Inderal)  10 mg PO BID [facility]


   Stop: 19 08:59


   Last Admin: 19 09:11 Dose:  Not Given


Simvastatin (Zocor)  20 mg PO DAILY [facility]; Protocol


   Stop: 19 08:59


   Last Admin: 19 09:11 Dose:  Not Given


Tamsulosin HCl (Flomax)  0.8 mg PO DAILY [facility]


   Stop: 19 08:59


   Last Admin: 19 09:11 Dose:  Not Given








General: demented, thin


HEENT: NC/AT, PERRLA, EOMI


Neck: Supple, No JVD


Lungs: CTAB


Cardiovascular: RRR, Normal S1, Normal S2, with murmur


Abdomen: soft, thin, globular, positive bowel sound


Extremities: excoriation, contracture


Neurological: no change, disorganized





Internal Medicine Assmt/Plan





- Assessment


Assessment: 





ASSESSMENT:  Failure to thrive, dehydration, diabetes, hypertension, BPH, CAD,


cardiac arrhythmia, hypercholesterolemia, moderate protein calorie malnutrition,


poor p.o. intake.  chronic stroke





- Plan


Plan: 








PLAN:  We will continue the patient on gentle IV hydration.  Monitor for any


signs and symptoms of fluid overload.  We will titrate the patient on diabetic


medications.  Hold Lantus.  Continue with insulin sliding scale.  We will


perform a head CT ___to assess cns status of_ the patient.  We will try to get 

information from the


spouse as well, supposedly she is coming to have a conversation.  Discussed with


nursing staff as well.


pt's wife requesting hospice


pending





Nutritional Asmnt/Malnutr-PDOC





- Dietary Evaluation


Malnutrition Findings (Please click <Entered> for more info): 








Nutritional Asmnt/Malnutrition                             Start:  19 15:

11


Text:                                                      Status: Active      

  


Freq:                                                                          

  


Protocol:                                                                      

  


 Document     19 15:11  [id number]  (Rec: 19 15:20  [facility] [id number])


 Nutritional Asmnt/Malnutrition


     Patient General Information


      Nutritional Screening                      Moderate Risk


      Diagnosis                                  Dehydration, Generalized


                                                 weakness, Agitation


      Pertinent Medical Hx/Surgical Hx           HTN, DM, CAD, Dyslipidemia (


                                                 hyperlipidemia), Arthritis,


                                                 BPH


      Subjective Information                     A 84 years old male was


                                                 admitted from SNF d/t


                                                 Dehydration, Generalized


                                                 weakness and Agitation. Pt is


                                                 currently on [facility] diet


                                                 providing 1967 Kcal and 94 g


                                                 protein. Per chart, Pt refused


                                                 to eat, medications and


                                                 accucheck.  Per chart, PO


                                                 intake 0% for all meals on .


      Current Diet Order/ Nutrition Support      [facility]


      Pertinent Medications                      Maalox, D50W, Glutose 40%, D5-


                                                 0.9%ns, Glucagen, INS-SS


      Pertinent Labs                              POC Glu 144


                                                  , POTASS 2.9, Glu


                                                 144, POC Glu 127, 135, 153


     Nutritional Hx/Data


      Height                                     1.73 m


      Height (Calculated Centimeters)            172.7


      Current Weight (lbs)                       74.389 kg


      Weight (Calculated Kilograms)              74.4


      Weight (Calculated Grams)                  97371.1


      Ideal Body Weight                          68.4 kg


      Body Mass Index (BMI)                      24.9


      Weight Status                              Approriate


     GI Symptoms


      GI Symptoms                                None


      Last BM                                    


      Difficult in:                              None


      Skin Integrity/Comment:                    Servando 11, Skin Intact


      Current %PO                                Negligible < 25%


     Estimated Nutritional Goals


      BEE in Kcals:                              Using Current wt


      Calories/Kcals/Kg                          25-30 Kcal/kg


      Kcals Calculated                           2156-1233 Kcal


      Protein:                                   Using Current wt


      Protein g/k-1.2g/kg


      Protein Calculated                         74-90 g


      Fluid: ml                                  4147-5312 ml (1ml/kcal)


     Nutritional Problem


      2. Problem


       Problem                                   Altered nutrition related lab


                                                 result


       Etiology                                  medical condition


       Signs/Symptoms:                           lab result Glucose 144 and POC


                                                 glucose ranging 127-153


      1. Problem


       Problem                                   Inadequate oral intake


       Etiology                                  possible low appetite and pt


                                                 refuse to eat


       Signs/Symptoms:                           PO intake 0% for all meals on


                                                 


     Malnutrition Related to Morbid Obesity


      Malnutrition related to morbid obesity     No


     Intervention/Recommendation


      Comments                                   1. Continue with [facility] diet as


                                                 ordered.


                                                 2. RN to encourage PO intake.


                                                 3. Monitor PO intake, wt, labs


                                                 and skin integrity


                                                 4. F/U as HR in 1-2 days


     Expected Outcomes/Goals


      Expected Outcomes/Goals                    1. Pt to meet at least 75% of


                                                 nutritional needs via


                                                 nutrition support with


                                                 tolerance


                                                 2. Wt stability, skin to


                                                 remain intact, labs to


                                                 approach WNL.

## 2019-06-27 NOTE — DISCHARGE SUMMARY
DATE OF DISCHARGE:  06/25/2019



FINAL DIAGNOSES:

1.  Failure to thrive.

2.  Diabetes.

3.  Dehydration.

4.  Hypertension.

5.  Benign prostatic hyperplasia.

6.  Coronary artery disease.

7.  Cardiac arrhythmia.

8.  Hypercholesterolemia.

9.  Moderate protein calorie malnutrition.

10.  Dementia.



HISTORY:  This is an 84-year-old  male with history of diabetes,

hypertension, hypercholesterolemia, BPH, CAD, cardiac arrhythmia, admitted from

acute psychiatric hospital secondary to not eating or drinking.  The patient

evaluated for further management.



PHYSICAL EXAMINATION:

VITAL SIGNS:  Blood pressure 115/79, respiration 20, pulse 71, temperature 97.6.

GENERAL:  Elderly male who appears his stated age.

NECK:  Supple.  No mass.

LUNGS:  Equal breath sounds with few rhonchi.

HEART:  Regular rate and rhythm with systolic ejection murmur.

ABDOMEN:  Soft, globular.

EXTREMITIES:  Positive excoriation, atrophy, contractures.

NEUROLOGIC:  Limited.



HOSPITAL COURSE:  The patient was admitted to medical floor, continued on oxygen

and bronchodilator treatments and IV hydration.  The patient was evaluated by

Per Dr. Luis for Psychiatry and psychotropic medication was adjusted.  The

patient made some improvement, but the patient was persistent with p.o. intake

including diet and nutrition and medication.  Wife decided to drive the patient

to hospice.  The patient accepted.



CONDITION ON DISCHARGE:  Fair.



OVERALL PROGNOSIS:  Poor.



DISCHARGE INSTRUCTIONS:  The patient will be admitted to hospice service ____

less than 6 months the way he is going at this point.





DD: 06/27/2019 12:42

DT: 06/27/2019 13:48

[facility]# [id number]  [id number]

## 2021-02-09 NOTE — NUR
Bladder scan 35 mL urine PATIENT IS STABLE, NO SIGNS OF APPARENT SOB, OR PAIN, PATIENT IS SLEEPING AT
THIS TIME. WIFE AND SITTER AT BEDSIDE. SAFETY PRECAUTIONS IN PLACE.